# Patient Record
Sex: MALE | Race: ASIAN | NOT HISPANIC OR LATINO | Employment: OTHER | URBAN - METROPOLITAN AREA
[De-identification: names, ages, dates, MRNs, and addresses within clinical notes are randomized per-mention and may not be internally consistent; named-entity substitution may affect disease eponyms.]

---

## 2017-06-06 DIAGNOSIS — G40.909 SEIZURE DISORDER (H): ICD-10-CM

## 2017-06-06 DIAGNOSIS — I10 ESSENTIAL HYPERTENSION, MALIGNANT: ICD-10-CM

## 2017-06-07 RX ORDER — LEVETIRACETAM 1000 MG/1
1 TABLET ORAL 2 TIMES DAILY
Qty: 180 TABLET | Refills: 1 | Status: SHIPPED | OUTPATIENT
Start: 2017-06-07 | End: 2017-12-07

## 2017-06-07 RX ORDER — AMLODIPINE BESYLATE 10 MG/1
10 TABLET ORAL DAILY
Qty: 90 TABLET | Refills: 1 | Status: SHIPPED | OUTPATIENT
Start: 2017-06-07 | End: 2017-12-07

## 2017-07-14 DIAGNOSIS — I10 ESSENTIAL HYPERTENSION, MALIGNANT: ICD-10-CM

## 2017-07-15 RX ORDER — METOPROLOL TARTRATE 100 MG
100 TABLET ORAL 2 TIMES DAILY
Qty: 180 TABLET | Refills: 3 | Status: SHIPPED | OUTPATIENT
Start: 2017-07-15 | End: 2018-07-09

## 2017-07-19 ENCOUNTER — OFFICE VISIT (OUTPATIENT)
Dept: FAMILY MEDICINE | Facility: CLINIC | Age: 37
End: 2017-07-19

## 2017-07-19 VITALS
OXYGEN SATURATION: 95 % | TEMPERATURE: 98.1 F | WEIGHT: 241.2 LBS | HEIGHT: 65 IN | HEART RATE: 119 BPM | BODY MASS INDEX: 40.19 KG/M2 | DIASTOLIC BLOOD PRESSURE: 72 MMHG | SYSTOLIC BLOOD PRESSURE: 105 MMHG

## 2017-07-19 DIAGNOSIS — I62.9 INTRACRANIAL HEMORRHAGE (H): ICD-10-CM

## 2017-07-19 DIAGNOSIS — I10 ESSENTIAL HYPERTENSION, MALIGNANT: Primary | ICD-10-CM

## 2017-07-19 DIAGNOSIS — F10.10 ETOH ABUSE: ICD-10-CM

## 2017-07-19 DIAGNOSIS — G40.309 GENERALIZED CONVULSIVE EPILEPSY (H): ICD-10-CM

## 2017-07-19 LAB
ALBUMIN SERPL BCP-MCNC: 3.7 G/DL (ref 3.5–5)
ALP SERPL-CCNC: 75 U/L (ref 45–120)
ALT SERPL W/O P-5'-P-CCNC: 110 U/L (ref 0–45)
AST SERPL-CCNC: 92 U/L (ref 0–40)
BILIRUB DIRECT SERPL-MCNC: 0.2 MG/DL (ref 0–0.5)
BILIRUB SERPL-MCNC: 0.6 MG/DL (ref 0–1)
BUN SERPL-MCNC: 10 MG/DL (ref 5–24)
CALCIUM SERPL-MCNC: 9.5 MG/DL (ref 8.5–10.4)
CHLORIDE SERPLBLD-SCNC: 102 MMOL/L (ref 94–109)
CO2 SERPL-SCNC: 23 MMOL/L (ref 20–32)
CREAT SERPL-MCNC: 1.4 MG/DL (ref 0.8–1.5)
EGFR CALCULATED (BLACK REFERENCE): 72.9 ML/MIN
EGFR CALCULATED (NON BLACK REFERENCE): 60.3 ML/MIN
GLUCOSE SERPL-MCNC: 119 MG/DL (ref 60–109)
LEVETIRACETAM SERPL-MCNC: 35.4 UG/ML (ref 6–46)
POTASSIUM SERPL-SCNC: 3.4 MMOL/L (ref 3.4–5.3)
PROT SERPL-MCNC: 7.9 G/DL (ref 6–8)
SODIUM SERPL-SCNC: 138 MMOL/L (ref 133–144)

## 2017-07-19 NOTE — MR AVS SNAPSHOT
"              After Visit Summary   7/19/2017    Daisy Sun    MRN: [id number]           Patient Information     Date Of Birth          1980        Visit Information        Provider Department      7/19/2017 3:40 PM Patricia Mason MD [facility]        Today's Diagnoses     Essential hypertension, malignant    -  1    Generalized convulsive epilepsy (H)        ETOH abuse           Follow-ups after your visit        Who to contact     Please call your clinic at [phone] to:    Ask questions about your health    Make or cancel appointments    Discuss your medicines    Learn about your test results    Speak to your doctor   If you have compliments or concerns about an experience at your clinic, or if you wish to file a complaint, please contact [facility] Patient Relations at [phone] or email us at [email or web address]         Additional Information About Your Visit        Care EveryWhere ID     This is your Care EveryWhere ID. This could be used by other organizations to access your [facility] medical records  [id number]        Your Vitals Were     Pulse Temperature Height Pulse Oximetry BMI (Body Mass Index)       119 98.1  F (36.7  C) (Oral) 5' 4.5\" (163.8 cm) 95% 40.76 kg/m2        Blood Pressure from Last 3 Encounters:   07/19/17 105/72   08/17/16 112/82   12/09/15 127/87    Weight from Last 3 Encounters:   07/19/17 241 lb 3.2 oz (109.4 kg)   08/17/16 227 lb (103 kg)   12/09/15 217 lb 6.4 oz (98.6 kg)              We Performed the Following     Basic Metabolic Panel ([facility])  - Results < 1 hr     Hepatic Profile ([facility])     Levetiracetam ([facility])        Primary Care Provider Office Phone # Fax #    Patricia Mason [phone]       [facility] [address] [place] [address]        Equal Access to Services     AMAN TALAVERA AH: Hadii aad ku hadasho Soomaali, waaxda luqadaha, qaybta kaalmada adeegyada, waxay " loreta briggsjose luis tonemarques bowersaan ah. So [facility] [phone].    ATENCIÓN: Si morganla radha, tiene a milan disposición servicios gratuitos de asistencia lingüística. Aman al [phone].    We comply with applicable federal civil rights laws and [place] laws. We do not discriminate on the basis of race, color, national origin, age, disability sex, sexual orientation or gender identity.            Thank you!     Thank you for choosing [facility]  for your care. Our goal is always to provide you with excellent care. Hearing back from our patients is one way we can continue to improve our services. Please take a few minutes to complete the written survey that you may receive in the mail after your visit with us. Thank you!             Your Updated Medication List - Protect others around you: Learn how to safely use, store and throw away your medicines at www.disposemymeds.org.          This list is accurate as of: 7/19/17  4:58 PM.  Always use your most recent med list.                   Brand Name Dispense Instructions for use Diagnosis    amLODIPine 10 MG tablet    NORVASC    90 tablet    Take 1 tablet (10 mg) by mouth daily    Essential hypertension, malignant       citalopram 40 MG tablet    celeXA    90 tablet    Take 1 tablet (40 mg) by mouth daily    Major depressive disorder, recurrent episode, moderate (H)       levETIRAcetam 1000 MG Tabs     180 tablet    Take 1 tablet by mouth 2 times daily    Seizure disorder (H)       losartan-hydrochlorothiazide 100-25 MG per tablet    HYZAAR    90 tablet    Take 1 tablet by mouth daily    Essential hypertension, malignant       metoprolol 100 MG tablet    LOPRESSOR    180 tablet    Take 1 tablet (100 mg) by mouth 2 times daily    Essential hypertension, malignant

## 2017-07-19 NOTE — LETTER
July 21, 2017      Daisy Dino  [address] [place] [address]        Dear Daisy,    Kidneys still are good.  Seizure med dose still good, liver is still inflammed and need to reduce alcohol intake no more than 3 beers/day!     Please see below for your test results.    Resulted Orders   Basic Metabolic Panel ([facility])  - Results < 1 hr   Result Value Ref Range    Glucose 119.0 (H) 60.0 - 109.0 mg/dL    Urea Nitrogen 10.0 5.0 - 24.0 mg/dL    Creatinine 1.4 0.8 - 1.5 mg/dL    Sodium 138.0 133.0 - 144.0 mmol/L    Potassium 3.4 3.4 - 5.3 mmol/L    Chloride 102.0 94.0 - 109.0 mmol/L    Carbon Dioxide 23.0 20.0 - 32.0 mmol/L    Calcium 9.5 8.5 - 10.4 mg/dL    eGFR Calculated (Non Black Reference) 60.3 >60.0 mL/min    eGFR Calculated (Black Reference) 72.9 >60.0 mL/min   [facility]   Result Value Ref Range    LEVETIRACETAM 35.4 6.0 - 46.0 ug/mL    Narrative    Test performed by:  [facility] [address] [place] [address]  Suggested Trough Concentrations: 6.0 - 46.0 ug/mL   Hepatic Profile ([facility])   Result Value Ref Range    Bilirubin Total 0.6 0.0 - 1.0 mg/dL    Bilirubin Direct 0.2 <=0.5 mg/dL    Protein Total 7.9 6.0 - 8.0 g/dL    Albumin 3.7 3.5 - 5.0 g/dL    Alkaline Phosphatase 75 45 - 120 U/L    AST (SGOT) 92 (H) 0 - 40 U/L    ALT (SGPT) 110 (H) 0 - 45 U/L    Narrative    Test performed by:  [facility] [address] [place] [address]       If you have any questions, please call the clinic to make an appointment.    Sincerely,    Patricia Mason MD

## 2017-07-19 NOTE — PROGRESS NOTES
HPI:       Daisy Sun is a 36 year old  male with a significant past medical history of hypertension who presents for follow up of concern(s) listed below    1.  HTN        A Hmong  was used for this visit         PMHX:     Patient Active Problem List   Diagnosis     Essential hypertension, malignant     Anoxic brain damage (H)     Health Care Home     Generalized convulsive epilepsy (H)     Intracranial hemorrhage (H)     Major depressive disorder, recurrent episode, moderate (H)     Hemiparesis affecting right side as late effect of cerebrovascular accident (H)     Tobacco dependence syndrome     Alcohol abuse     Adiposity     Drug-induced mood disorder (H)       Current Outpatient Prescriptions   Medication Sig Dispense Refill     metoprolol (LOPRESSOR) 100 MG tablet Take 1 tablet (100 mg) by mouth 2 times daily 180 tablet 3     levETIRAcetam 1000 MG TABS Take 1 tablet by mouth 2 times daily 180 tablet 1     amLODIPine (NORVASC) 10 MG tablet Take 1 tablet (10 mg) by mouth daily 90 tablet 1     citalopram (CELEXA) 40 MG tablet Take 1 tablet (40 mg) by mouth daily 90 tablet 3     losartan-hydrochlorothiazide (HYZAAR) 100-25 MG per tablet Take 1 tablet by mouth daily 90 tablet 3       Social History     Social History     Marital status: Single     Spouse name: N/A     Number of children: N/A     Years of education: N/A     Occupational History     disabled      Social History Main Topics     Smoking status: Current Every Day Smoker     Types: Cigarettes     Smokeless tobacco: Never Used      Comment: chain smoking     Alcohol use 0.0 oz/week     0 Standard drinks or equivalent per week      Comment: >10 beers/day     Drug use: No     Sexual activity: Not on file     Other Topics Concern     Not on file     Social History Narrative    Lives with brother, sister in law and their children        No Known Allergies    Results for orders placed or performed in visit on 07/19/17 (from the past 24 hour(s))  "  Basic Metabolic Panel ([facility])  - Results < 1 hr   Result Value Ref Range    Glucose 119.0 (H) 60.0 - 109.0 mg/dL    Urea Nitrogen 10.0 5.0 - 24.0 mg/dL    Creatinine 1.4 0.8 - 1.5 mg/dL    Sodium 138.0 133.0 - 144.0 mmol/L    Potassium 3.4 3.4 - 5.3 mmol/L    Chloride 102.0 94.0 - 109.0 mmol/L    Carbon Dioxide 23.0 20.0 - 32.0 mmol/L    Calcium 9.5 8.5 - 10.4 mg/dL    eGFR Calculated (Non Black Reference) 60.3 >60.0 mL/min    eGFR Calculated (Black Reference) 72.9 >60.0 mL/min            Review of Systems:   C: NEGATIVE for fatigue, unexpected change in weight  E: NEGATIVE for acute vision problems or changes  R: NEGATIVE for significant cough or shortness of breath  CV: NEGATIVE for chest pain, palpitations or new or worsening peripheral edema  P: NEGATIVE for changes in mood or affect          Physical Exam:     Vitals:    07/19/17 1602   BP: 105/72   BP Location: Left arm   Patient Position: Chair   Cuff Size: Adult Regular   Pulse: 119   Temp: 98.1  F (36.7  C)   TempSrc: Oral   SpO2: 95%   Weight: 241 lb 3.2 oz (109.4 kg)   Height: 5' 4.5\" (163.8 cm)     Body mass index is 40.76 kg/(m^2).    GENERAL APPEARANCE: alert and no distress, post stroke changes and slurred speech but able to commmunicate  RESP: lungs clear to auscultation - no rales, rhonchi or wheezes  CV: regular rate and rhythm,  and no murmur, click,  rub or gallop  NEURO: right facial droop, right arm flexed with hand flexed, right leg with AFO and walks with swinging gait      Assessment and Plan     (I10) Essential hypertension, malignant  (primary encounter diagnosis)  Comment: controlled on current meds  Plan: Basic Metabolic Panel ([facility])  - Results < 1         hr          (G40.309) Generalized convulsive epilepsy (H)  Comment: appears to be stable without active or breakthrough seizures  Plan: Levetiracetam ("[facility] [employer]        Check levels  (F10.10) ETOH abuse  Comment: reviewed reduced intake, safe intake, on current meds " interactions  Plan: Hepatic Profile ([facility]), CANCELED: HEPATIC        PANEL ([facility])        Check liver enzymes and adjust as needed.    STROKE: stable symptoms and on bp and lipids, hx of hemorrhagic so avoid ASA      Options for treatment and follow-up care were reviewed with the patient and/or guardian. Daisy Sun and/or guardian engaged in the decision making process and verbalized understanding of the options discussed and agreed with the final plan.    Patricia Mason MD

## 2017-07-21 NOTE — PROGRESS NOTES
Call patient:    Kidneys still are good.  Seizure med dose still good, liver is still inflammed and need to reduce alcohol intake no more than 3 beers/day!

## 2017-09-07 DIAGNOSIS — I10 ESSENTIAL HYPERTENSION, MALIGNANT: ICD-10-CM

## 2017-09-07 RX ORDER — LOSARTAN POTASSIUM AND HYDROCHLOROTHIAZIDE 25; 100 MG/1; MG/1
1 TABLET ORAL DAILY
Qty: 90 TABLET | Refills: 3 | Status: SHIPPED | OUTPATIENT
Start: 2017-09-07 | End: 2018-07-25

## 2017-12-07 DIAGNOSIS — G40.909 SEIZURE DISORDER (H): ICD-10-CM

## 2017-12-07 DIAGNOSIS — I10 ESSENTIAL HYPERTENSION, MALIGNANT: ICD-10-CM

## 2017-12-07 RX ORDER — LEVETIRACETAM 1000 MG/1
1 TABLET ORAL 2 TIMES DAILY
Qty: 180 TABLET | Refills: 3 | Status: SHIPPED | OUTPATIENT
Start: 2017-12-07 | End: 2018-11-06

## 2017-12-07 RX ORDER — AMLODIPINE BESYLATE 10 MG/1
10 TABLET ORAL DAILY
Qty: 90 TABLET | Refills: 3 | Status: SHIPPED | OUTPATIENT
Start: 2017-12-07 | End: 2018-12-17

## 2018-03-12 DIAGNOSIS — F33.1 MAJOR DEPRESSIVE DISORDER, RECURRENT EPISODE, MODERATE (H): ICD-10-CM

## 2018-03-13 RX ORDER — CITALOPRAM HYDROBROMIDE 40 MG/1
40 TABLET ORAL DAILY
Qty: 90 TABLET | Refills: 3 | Status: SHIPPED | OUTPATIENT
Start: 2018-03-13 | End: 2019-03-20

## 2018-07-09 DIAGNOSIS — I10 ESSENTIAL HYPERTENSION, MALIGNANT: ICD-10-CM

## 2018-07-10 RX ORDER — METOPROLOL TARTRATE 100 MG
100 TABLET ORAL 2 TIMES DAILY
Qty: 180 TABLET | Refills: 0 | Status: SHIPPED | OUTPATIENT
Start: 2018-07-10 | End: 2020-11-23

## 2018-07-25 ENCOUNTER — OFFICE VISIT (OUTPATIENT)
Dept: FAMILY MEDICINE | Facility: CLINIC | Age: 38
End: 2018-07-25
Payer: MEDICARE

## 2018-07-25 ENCOUNTER — RECORDS - HEALTHEAST (OUTPATIENT)
Dept: ADMINISTRATIVE | Facility: OTHER | Age: 38
End: 2018-07-25

## 2018-07-25 VITALS
BODY MASS INDEX: 40.9 KG/M2 | HEART RATE: 70 BPM | OXYGEN SATURATION: 97 % | TEMPERATURE: 98 F | SYSTOLIC BLOOD PRESSURE: 109 MMHG | DIASTOLIC BLOOD PRESSURE: 71 MMHG | WEIGHT: 242 LBS

## 2018-07-25 DIAGNOSIS — R10.11 ABDOMINAL PAIN, RIGHT UPPER QUADRANT: ICD-10-CM

## 2018-07-25 DIAGNOSIS — E87.1 HYPONATREMIA: ICD-10-CM

## 2018-07-25 DIAGNOSIS — F10.10 ALCOHOL ABUSE: ICD-10-CM

## 2018-07-25 DIAGNOSIS — I10 ESSENTIAL HYPERTENSION, MALIGNANT: ICD-10-CM

## 2018-07-25 DIAGNOSIS — G40.309 GENERALIZED CONVULSIVE EPILEPSY (H): Primary | ICD-10-CM

## 2018-07-25 DIAGNOSIS — I69.351 HEMIPARESIS AFFECTING RIGHT SIDE AS LATE EFFECT OF CEREBROVASCULAR ACCIDENT (H): ICD-10-CM

## 2018-07-25 LAB
ALBUMIN SERPL-MCNC: 3.6 G/DL (ref 3.3–4.6)
ALP SERPL-CCNC: 89 U/L (ref 40–150)
ALT SERPL-CCNC: 81 U/L (ref 0–45)
AST SERPL-CCNC: 98 U/L (ref 0–55)
BILIRUB SERPL-MCNC: 0.6 MG/DL (ref 0.2–1.3)
BUN SERPL-MCNC: 9 MG/DL (ref 5–24)
CALCIUM SERPL-MCNC: 8.8 MG/DL (ref 8.5–10.4)
CHLORIDE SERPLBLD-SCNC: 101 MMOL/L (ref 94–109)
CO2 SERPL-SCNC: 20 MMOL/L (ref 20–32)
CREAT SERPL-MCNC: 1.2 MG/DL (ref 0.8–1.5)
EGFR CALCULATED (BLACK REFERENCE): 86.7 ML/MIN
EGFR CALCULATED (NON BLACK REFERENCE): 71.6 ML/MIN
GLUCOSE SERPL-MCNC: 102 MG/DL (ref 60–109)
LEVETIRACETAM SERPL-MCNC: 44.1 UG/ML (ref 6–46)
POTASSIUM SERPL-SCNC: 3.6 MMOL/L (ref 3.4–5.3)
PROT SERPL-MCNC: 7.8 G/DL (ref 6.6–8.8)
SODIUM SERPL-SCNC: 129 MMOL/L (ref 133–144)

## 2018-07-25 RX ORDER — LOSARTAN POTASSIUM 100 MG/1
100 TABLET ORAL DAILY
Qty: 90 TABLET | Refills: 3 | Status: SHIPPED | OUTPATIENT
Start: 2018-07-25 | End: 2018-09-06

## 2018-07-25 NOTE — MR AVS SNAPSHOT
"              After Visit Summary   2018    Daisy Sun    MRN: [id number]           Patient Information     Date Of Birth          1980        Visit Information        Provider Department      2018 1:40 PM Patricia Mason MD [facility]        Today's Diagnoses     Generalized convulsive epilepsy (H)    -  1    Alcohol abuse        Essential hypertension, malignant        Abdominal pain, right upper quadrant        Hemiparesis affecting right side as late effect of cerebrovascular accident (H)        Hyponatremia          Care Instructions      Can consider trying some \"non-alcoholic\" beer.     Cut back on your beer drinking.     Below are some examples.     Continue all current medications.     Follow up in Late September, Early October.                   Follow-ups after your visit        Your next 10 appointments already scheduled     Sep 19, 2018  2:00 PM CDT   Return Visit with Patricia Mason MD   [facility] ([facility])    [address] [place] [address]   [phone]              Who to contact     Please call your clinic at [phone] to:    Ask questions about your health    Make or cancel appointments    Discuss your medicines    Learn about your test results    Speak to your doctor            Additional Information About Your Visit        MyChart Information     [employer] is an electronic gateway that provides easy, online access to your medical records. With [employer], you can request a clinic appointment, read your test results, renew a prescription or communicate with your care team.     To sign up for Return Patht visit the website at [email or web address]   You will be asked to enter the access code listed below, as well as some personal information. Please follow the directions to create your username and password.     Your access code is: [email or web address]  Expires: 10/23/2018  1:41 PM     Your access code will  in 90 days. If you need " help or a new code, please contact your [facility] or call [phone] for assistance.        Care EveryWhere ID     This is your Care EveryWhere ID. This could be used by other organizations to access your [facility] medical records  [id number]        Your Vitals Were     Pulse Temperature Pulse Oximetry BMI (Body Mass Index)          70 98  F (36.7  C) (Oral) 97% 40.9 kg/m2         Blood Pressure from Last 3 Encounters:   07/25/18 109/71   07/19/17 105/72   08/17/16 112/82    Weight from Last 3 Encounters:   07/25/18 242 lb (109.8 kg)   07/19/17 241 lb 3.2 oz (109.4 kg)   08/17/16 227 lb (103 kg)              We Performed the Following     Comprehensive Metabolic Panel ([facility]) - results <1hr Protocol     Levetiracetam ([facility])     US ABDOMEN COMPLETE          Today's Medication Changes          These changes are accurate as of 7/25/18  2:49 PM.  If you have any questions, ask your nurse or doctor.               Start taking these medicines.        Dose/Directions    losartan 100 MG tablet   Commonly known as:  COZAAR   Used for:  Essential hypertension, malignant   Started by:  Patricia Mason MD        Dose:  100 mg   Take 1 tablet (100 mg) by mouth daily   Quantity:  90 tablet   Refills:  3       order for DME   Used for:  Hemiparesis affecting right side as late effect of cerebrovascular accident (H)   Started by:  Patriica Mason MD        Equipment being ordered: New AFO brace   Quantity:  1 Units   Refills:  0            Where to get your medicines      These medications were sent to [id number] - [place], [place] - [address], [place] [address]     Phone:  [phone]     losartan 100 MG tablet         Some of these will need a paper prescription and others can be bought over the counter.  Ask your nurse if you have questions.     Bring a paper prescription for each of these medications     order for DME                 Primary Care Provider Office Phone # Fax #    Patricia Mason [phone]       [address] [place] [address]        Equal Access to Services     NEVAZAY SADAF : Hadjuan fabian chaidez mylene White, waaxda luqadaha, qaybta kaalmada nnamdi, sinan martinez laChrisjennifer valdes. So [facility] [phone].    ATENCIÓN: Si habla español, tiene a milan disposición servicios gratuitos de asistencia lingüística. Llame al [phone].    We comply with applicable federal civil rights laws and [place] laws. We do not discriminate on the basis of race, color, national origin, age, disability, sex, sexual orientation, or gender identity.            Thank you!     Thank you for choosing [facility]  for your care. Our goal is always to provide you with excellent care. Hearing back from our patients is one way we can continue to improve our services. Please take a few minutes to complete the written survey that you may receive in the mail after your visit with us. Thank you!             Your Updated Medication List - Protect others around you: Learn how to safely use, store and throw away your medicines at www.disposemymeds.org.          This list is accurate as of 7/25/18  2:49 PM.  Always use your most recent med list.                   Brand Name Dispense Instructions for use Diagnosis    amLODIPine 10 MG tablet    NORVASC    90 tablet    Take 1 tablet (10 mg) by mouth daily    Essential hypertension, malignant       citalopram 40 MG tablet    celeXA    90 tablet    Take 1 tablet (40 mg) by mouth daily    Major depressive disorder, recurrent episode, moderate (H)       levETIRAcetam 1000 MG Tabs     180 tablet    Take 1 tablet by mouth 2 times daily    Seizure disorder (H)       losartan 100 MG tablet    COZAAR    90 tablet    Take 1 tablet (100 mg) by mouth daily    Essential hypertension, malignant       losartan-hydrochlorothiazide 100-25 MG per tablet    HYZAAR    90 tablet    Take 1  tablet by mouth daily    Essential hypertension, malignant       metoprolol tartrate 100 MG tablet    LOPRESSOR    180 tablet    Take 1 tablet (100 mg) by mouth 2 times daily    Essential hypertension, malignant       order for DME     1 Units    Equipment being ordered: New AFO brace    Hemiparesis affecting right side as late effect of cerebrovascular accident (H)

## 2018-07-25 NOTE — PATIENT INSTRUCTIONS
"  Can consider trying some \"non-alcoholic\" beer.     Cut back on your beer drinking.     Below are some examples.     Continue all current medications.     Follow up in Late September, Early October.                 Referral for ( TEST )  :      US Abdomen Complete   LOCATION/PLACE/Provider :    [facility]   DATE & TIME :     7- at 7:00am  PHONE :     [phone]  FAX :     [phone]  ADDITIONAL INFORMATION :     NPO 8 hours prior to US  Appointment made by clinic staff/:    Kriss"

## 2018-07-25 NOTE — PROGRESS NOTES
Chief Complaint   Patient presents with     Hypertension     follow up.      Refill Request     BP meds     Medication Reconciliation     completed.        Assessment and Plan   (G40.309) Generalized convulsive epilepsy (H)  (primary encounter diagnosis)  Comment: seizure free on current meds per report of brother and patient  Plan: Levetiracetam ([facility])        Confirm levels    (F10.10) Alcohol abuse  Comment: some new RUQ pain, and elevated LFTS  Plan: Comprehensive Metabolic Panel ([facility]) -         results <1hr Protocol, US ABDOMEN COMPLETE        Check liver size, consistency, consider certainly reducing ETOH, discussed non-alcoholic beer if that would be a substitute for patient, has tried abstinence and not interested    (I10) Essential hypertension, malignant  Comment: stable  Plan: Comprehensive Metabolic Panel ([facility]) -         results <1hr Protocol, losartan (COZAAR) 100 MG        tablet        No change if renal fxn stable    (R10.11) Abdominal pain, right upper quadrant  Comment: see above  Plan: US ABDOMEN COMPLETE            (I69.351) Hemiparesis affecting right side as late effect of cerebrovascular accident (H)  Comment: Stable  Plan: order for DME        Needs new AFO for foot drop    (E87.1) Hyponatremia  Comment: appears new on labs, appears euvolemic, and will see if improves after stopping HCTZ  Plan: stop arb/HCTZ combo pill and start ARB only medication  -recheck in 2 weeks to see if stabilizes.        Options for treatment and follow-up care were reviewed with the patient and/or guardian. Daisy Dino and/or guardian engaged in the decision making process and verbalized understanding of the options discussed and agreed with the final plan.     Yadira Vargas, MS4    Preceptor Attestation:  I was present with the medical student who participated in the service and in the documentation of this note. I have verified the history and personally performed the physical exam and medical decision  making. I have verified the content of the note, which accurately reflects my assessment of the patient and the plan of care.  Supervising Physician:Patricia Mason MD  [facility]                         HPI:   Daisy Sun is a 37 year old male who presents to clinic today with his brother for follow up of several chronic medical issues.   His blood pressure has been well controlled at home and so they have stopped checking it at home.  He does not have any episodes of dizziness or lightheadedness.  No headaches.  He and his brother report that his mood is pretty stable and the celexa helps a lot.  He is still drinking 6-7 cans of beer per day.  He hasn't tried to stop because it helps distract him from pain on his right side after the stroke.  He describes it as muscle and nerve pain and reports that tylenol helps.  His brother did try to send him to rehab for alcohol use, but he refused to go.     A [employer]  was used for this visit         Review of Systems:   A comprehensive 12 point review of systems was negative unless otherwise noted in the HPI.          PMHX:   Active Problems List  Patient Active Problem List   Diagnosis     Essential hypertension, malignant     Anoxic brain damage (H)     Health Care Home     Generalized convulsive epilepsy (H)     Intracranial hemorrhage (H)     Major depressive disorder, recurrent episode, moderate (H)     Hemiparesis affecting right side as late effect of cerebrovascular accident (H)     Tobacco dependence syndrome     Alcohol abuse     Adiposity     Drug-induced mood disorder (H)     Active problem list reviewed and updated.    Current Medications  Current Outpatient Prescriptions   Medication Sig Dispense Refill     amLODIPine (NORVASC) 10 MG tablet Take 1 tablet (10 mg) by mouth daily 90 tablet 3     citalopram (CELEXA) 40 MG tablet Take 1 tablet (40 mg) by mouth daily 90 tablet 3     levETIRAcetam 1000 MG TABS Take 1 tablet by mouth 2 times daily  180 tablet 3     losartan (COZAAR) 100 MG tablet Take 1 tablet (100 mg) by mouth daily 90 tablet 3     metoprolol tartrate (LOPRESSOR) 100 MG tablet Take 1 tablet (100 mg) by mouth 2 times daily 180 tablet 0     order for DME Equipment being ordered: New AFO brace 1 Units 0     Medication list reviewed and updated.    Social History  Social History   Substance Use Topics     Smoking status: Current Every Day Smoker     Types: Cigarettes     Smokeless tobacco: Never Used      Comment: 6-7 per day.      Alcohol use 0.0 oz/week     0 Standard drinks or equivalent per week      Comment: 6-7 beers daily.      History   Drug Use No       Allergies  No Known Allergies  Allergies and Medication Intolerances Updated         Physical Exam:     Vitals:    07/25/18 1401   BP: 109/71   Pulse: 70   Temp: 98  F (36.7  C)   TempSrc: Oral   SpO2: 97%   Weight: 242 lb (109.8 kg)     Body mass index is 40.9 kg/(m^2).    GENERAL APPEARANCE: alert, no acute distress   RESP: lungs clear to auscultation - no rales, rhonchi, or wheezes   CV: regular rate and rhythm, no murmur, click, rub, or gallop   ABDOMEN: soft, tender in RUQ, difficult to palpate for HSM given habitus and some guarding  MSK: Right foot in AFO brace, Right arm with flexion contracture at elbow and hand  SKIN: no suspicious lesions or rashes   Neuro: Left facial droop and slurred speech  PSYCH: mood and affect stable, some smiling today

## 2018-07-25 NOTE — NURSING NOTE
Chief Complaint   Patient presents with     Hypertension     follow up.      Refill Request     BP meds     Medication Reconciliation     completed.        /71  Pulse 70  Temp 98  F (36.7  C) (Oral)  Wt 242 lb (109.8 kg)  SpO2 97%  BMI 40.9 kg/m2       name: Nejenni Valle  Language: Tavo  Agency: [employer]  Phone number: [phone]  Type of Interpretation: Face to Face, Spoken

## 2018-07-26 NOTE — PROGRESS NOTES
Call patient:    Please let family know his seizure med dose is correct, no changes to that medication.

## 2018-07-27 ENCOUNTER — HOSPITAL ENCOUNTER (OUTPATIENT)
Dept: ULTRASOUND IMAGING | Facility: HOSPITAL | Age: 38
Discharge: HOME | End: 2018-07-27
Attending: FAMILY MEDICINE

## 2018-07-27 ENCOUNTER — COMMUNICATION - HEALTHEAST (OUTPATIENT)
Dept: TELEHEALTH | Facility: CLINIC | Age: 38
End: 2018-07-27

## 2018-07-27 DIAGNOSIS — R10.11 RIGHT UPPER QUADRANT ABDOMINAL PAIN: ICD-10-CM

## 2018-07-31 NOTE — PROGRESS NOTES
Call patient:    Liver shows some fatty liver changes and is enlarged.  Healthy eating and drinking are very important.  Let's recheck labs this fall: November.

## 2018-08-16 ENCOUNTER — ALLIED HEALTH/NURSE VISIT (OUTPATIENT)
Dept: FAMILY MEDICINE | Facility: CLINIC | Age: 38
End: 2018-08-16
Payer: MEDICARE

## 2018-08-16 VITALS
HEIGHT: 65 IN | BODY MASS INDEX: 38.99 KG/M2 | DIASTOLIC BLOOD PRESSURE: 71 MMHG | OXYGEN SATURATION: 96 % | WEIGHT: 234 LBS | RESPIRATION RATE: 16 BRPM | SYSTOLIC BLOOD PRESSURE: 109 MMHG | TEMPERATURE: 98.1 F | HEART RATE: 83 BPM

## 2018-08-16 DIAGNOSIS — E87.1 HYPONATREMIA: ICD-10-CM

## 2018-08-16 LAB
BUN SERPL-MCNC: 13 MG/DL (ref 5–24)
CALCIUM SERPL-MCNC: 9.8 MG/DL (ref 8.5–10.4)
CHLORIDE SERPLBLD-SCNC: 108 MMOL/L (ref 94–109)
CO2 SERPL-SCNC: 22 MMOL/L (ref 20–32)
CREAT SERPL-MCNC: 1.3 MG/DL (ref 0.8–1.5)
EGFR CALCULATED (BLACK REFERENCE): 78.6 ML/MIN
EGFR CALCULATED (NON BLACK REFERENCE): 65 ML/MIN
GLUCOSE SERPL-MCNC: 148 MG/DL (ref 60–109)
POTASSIUM SERPL-SCNC: 3.8 MMOL/L (ref 3.4–5.3)
SODIUM SERPL-SCNC: 140 MMOL/L (ref 133–144)

## 2018-08-16 NOTE — MR AVS SNAPSHOT
After Visit Summary   8/16/2018    Daisy Sun    MRN: [id number]           Patient Information     Date Of Birth          1980        Visit Information        Provider Department      8/16/2018 2:00 PM Nurse, [facility]        Today's Diagnoses     Hyponatremia           Follow-ups after your visit        Your next 10 appointments already scheduled     Aug 16, 2018  1:30 PM CDT   LAB VISIT with [facility]   [facility] ([facility])    [address] [place] [address]   [phone]           If you are coming in for fasting labs, you will need to fast for 10-12 hours prior to your appt. Fasting labs include lipids, cholesterol, glucose, complete metabolic panel, basic metabolic panel, and triglycerides. Do not drink coffee or any other fluids. Water with medications are okay. Do not chew gum as well. If you have any further questions, please contact your health care team.                Aug 16, 2018  2:00 PM CDT   Nurse Visit with [facility] Nurse   [facility] ([facility])    [address] [place] [address]   [phone]            Sep 19, 2018  2:00 PM CDT   Return Visit with Patricia Mason MD   [facility] ([facility])    [address] [place] [address]   [phone]              Who to contact     Please call your clinic at [phone] to:    Ask questions about your health    Make or cancel appointments    Discuss your medicines    Learn about your test results    Speak to your doctor            Additional Information About Your Visit        MyChart Information     WHATT is an electronic gateway that provides easy, online access to your medical records. With WHATT, you can request a clinic appointment, read your test results, renew a prescription or communicate with your care team.     To sign up for [employer] visit the website at [email or web address]   You will be asked to enter the  "access code listed below, as well as some personal information. Please follow the directions to create your username and password.     Your access code is: [email or web address]  Expires: 10/23/2018  1:41 PM     Your access code will  in 90 days. If you need help or a new code, please contact your [facility] or call [phone] for assistance.        Care EveryWhere ID     This is your Care EveryWhere ID. This could be used by other organizations to access your [facility] medical records  [id number]        Your Vitals Were     Pulse Temperature Respirations Height Pulse Oximetry BMI (Body Mass Index)    83 98.1  F (36.7  C) (Oral) 16 5' 5\" (165.1 cm) 96% 38.94 kg/m2       Blood Pressure from Last 3 Encounters:   18 109/71   18 109/71   17 105/72    Weight from Last 3 Encounters:   18 234 lb (106.1 kg)   18 242 lb (109.8 kg)   17 241 lb 3.2 oz (109.4 kg)              We Performed the Following     Basic Metabolic Panel ([facility])  - Results < 1 hr        Primary Care Provider Office Phone # Fax #    Patricia Mason [phone]       [address] [place] [address]        Equal Access to Services     AMAN TALAVERA AH: Hadii fabian chaidez hadasho Somilind, waaxda luqadaha, qaybta kaalmada nnamdi, sinan valdes. So [facility] [phone].    ATENCIÓN: Si habla español, tiene a milan disposición servicios gratuitos de asistencia lingüística. Llame al [phone].    We comply with applicable federal civil rights laws and [place] laws. We do not discriminate on the basis of race, color, national origin, age, disability, sex, sexual orientation, or gender identity.            Thank you!     Thank you for choosing [facility]  for your care. Our goal is always to provide you with excellent care. Hearing back from our patients is one way we can continue to improve our services. Please take a few minutes to " complete the written survey that you may receive in the mail after your visit with us. Thank you!             Your Updated Medication List - Protect others around you: Learn how to safely use, store and throw away your medicines at www.disposemymeds.org.          This list is accurate as of 8/16/18  1:10 PM.  Always use your most recent med list.                   Brand Name Dispense Instructions for use Diagnosis    amLODIPine 10 MG tablet    NORVASC    90 tablet    Take 1 tablet (10 mg) by mouth daily    Essential hypertension, malignant       citalopram 40 MG tablet    celeXA    90 tablet    Take 1 tablet (40 mg) by mouth daily    Major depressive disorder, recurrent episode, moderate (H)       levETIRAcetam 1000 MG Tabs     180 tablet    Take 1 tablet by mouth 2 times daily    Seizure disorder (H)       losartan 100 MG tablet    COZAAR    90 tablet    Take 1 tablet (100 mg) by mouth daily    Essential hypertension, malignant       metoprolol tartrate 100 MG tablet    LOPRESSOR    180 tablet    Take 1 tablet (100 mg) by mouth 2 times daily    Essential hypertension, malignant       order for DME     1 Units    Equipment being ordered: New AFO brace    Hemiparesis affecting right side as late effect of cerebrovascular accident (H)

## 2018-09-06 DIAGNOSIS — I10 ESSENTIAL HYPERTENSION, MALIGNANT: ICD-10-CM

## 2018-09-07 RX ORDER — LOSARTAN POTASSIUM 100 MG/1
100 TABLET ORAL DAILY
Qty: 90 TABLET | Refills: 3 | Status: SHIPPED | OUTPATIENT
Start: 2018-09-07 | End: 2019-11-15

## 2018-09-19 ENCOUNTER — OFFICE VISIT (OUTPATIENT)
Dept: FAMILY MEDICINE | Facility: CLINIC | Age: 38
End: 2018-09-19
Payer: MEDICARE

## 2018-09-19 VITALS
HEART RATE: 86 BPM | OXYGEN SATURATION: 96 % | SYSTOLIC BLOOD PRESSURE: 100 MMHG | DIASTOLIC BLOOD PRESSURE: 60 MMHG | BODY MASS INDEX: 37.82 KG/M2 | RESPIRATION RATE: 20 BRPM | WEIGHT: 227 LBS | HEIGHT: 65 IN | TEMPERATURE: 98.2 F

## 2018-09-19 DIAGNOSIS — E87.1 HYPONATREMIA: Primary | ICD-10-CM

## 2018-09-19 DIAGNOSIS — F10.10 ALCOHOL ABUSE: ICD-10-CM

## 2018-09-19 DIAGNOSIS — R94.5 ABNORMAL RESULTS OF LIVER FUNCTION STUDIES: ICD-10-CM

## 2018-09-19 LAB
ALBUMIN SERPL-MCNC: 3.5 G/DL (ref 3.3–4.6)
ALP SERPL-CCNC: 82 U/L (ref 40–150)
ALT SERPL-CCNC: 118 U/L (ref 0–45)
AST SERPL-CCNC: 142 U/L (ref 0–55)
BILIRUB SERPL-MCNC: 1 MG/DL (ref 0.2–1.3)
BUN SERPL-MCNC: 8 MG/DL (ref 5–24)
CALCIUM SERPL-MCNC: 9 MG/DL (ref 8.5–10.4)
CHLORIDE SERPLBLD-SCNC: 107 MMOL/L (ref 94–109)
CO2 SERPL-SCNC: 20 MMOL/L (ref 20–32)
CREAT SERPL-MCNC: 1.4 MG/DL (ref 0.8–1.5)
EGFR CALCULATED (BLACK REFERENCE): 72.2 ML/MIN
EGFR CALCULATED (NON BLACK REFERENCE): 59.7 ML/MIN
GLUCOSE SERPL-MCNC: 162 MG/DL (ref 60–109)
POTASSIUM SERPL-SCNC: 3.9 MMOL/L (ref 3.4–5.3)
PROT SERPL-MCNC: 8 G/DL (ref 6.6–8.8)
SODIUM SERPL-SCNC: 142 MMOL/L (ref 133–144)

## 2018-09-19 NOTE — PROGRESS NOTES
"       HPI:       Daisy Sun is a 36 year old  male with a significant past medical history of hypertension who presents for follow up of concern(s) listed below    1.  Abnormal liver testing  -denies changes in alcohol, 6 cans/day, gets drunk almost every, has these every night  -not interested in really stopping    2. Hx of hyponatremia:  -unsure as to etiology, does drink water during the day, no obvious symptoms    3.  Mood: stress and depression  -states contributes to his drinking, if \"I had use of my arm and leg I wouldn't drink\"  Not interested in other options      A Purkinje  was used for this visit         PMHX:     Patient Active Problem List   Diagnosis     Essential hypertension, malignant     Anoxic brain damage (H)     Health Care Home     Generalized convulsive epilepsy (H)     Intracranial hemorrhage (H)     Major depressive disorder, recurrent episode, moderate (H)     Hemiparesis affecting right side as late effect of cerebrovascular accident (H)     Tobacco dependence syndrome     Alcohol abuse     Adiposity     Drug-induced mood disorder (H)       Current Outpatient Prescriptions   Medication Sig Dispense Refill     amLODIPine (NORVASC) 10 MG tablet Take 1 tablet (10 mg) by mouth daily 90 tablet 3     losartan (COZAAR) 100 MG tablet Take 1 tablet (100 mg) by mouth daily 90 tablet 3     metoprolol tartrate (LOPRESSOR) 100 MG tablet Take 1 tablet (100 mg) by mouth 2 times daily 180 tablet 0     citalopram (CELEXA) 40 MG tablet Take 1 tablet (40 mg) by mouth daily 90 tablet 3     levETIRAcetam 1000 MG TABS Take 1 tablet by mouth 2 times daily 180 tablet 3     order for DME Equipment being ordered: New AFO brace 1 Units 0       Social History     Social History     Marital status: Single     Spouse name: N/A     Number of children: N/A     Years of education: N/A     Occupational History     disabled      Social History Main Topics     Smoking status: Current Every Day Smoker     Types: " "Cigarettes     Smokeless tobacco: Never Used      Comment: 6-7 per day.      Alcohol use 0.0 oz/week     0 Standard drinks or equivalent per week      Comment: 6-7 beers daily.      Drug use: No     Sexual activity: Not on file     Other Topics Concern     Not on file     Social History Narrative    Lives with brother, sister in law and their children        No Known Allergies             Review of Systems:   C: NEGATIVE for fatigue, unexpected change in weight  P: NEGATIVE for changes in mood or affect          Physical Exam:     Vitals:    09/19/18 1405 09/19/18 1425   BP: 95/60 100/60   Pulse: 86    Resp: 20    Temp: 98.2  F (36.8  C)    TempSrc: Oral    SpO2: 96%    Weight: 227 lb (103 kg)    Height: 5' 4.92\" (164.9 cm)      Body mass index is 37.87 kg/(m^2).    GENERAL APPEARANCE: alert and no distress, post stroke changes and slurred speech but able to commmunicate  RESP: lungs clear to auscultation - no rales, rhonchi or wheezes  CV: regular rate and rhythm,  and no murmur, click,  rub or gallop  NEURO: right facial droop, right arm flexed with hand flexed, right leg with AFO and walks with swinging gait      Assessment and Plan     (E87.1) Hyponatremia  (primary encounter diagnosis)  Comment: resolved  Plan: Comprehensive Metabolic Panel ([facility])        No med changes needed    (R94.5) Abnormal results of liver function studies  Comment: unsure if most obvious ETOH abuse is the sole cause  Plan: Comprehensive Metabolic Panel ([facility]),         Hepatitis B Surface Ab ([facility]), Hepatitis         C Antibody ([facility])        Will confirm no other etiology    (F10.10) Alcohol abuse  Comment: Discussed options  Plan: not ready to quit.  When asked if he would stop if he new it was \"causing him liver damage\" and was non-comital.    Recheck in November: f/u bp which is lower than expected, f/u labs and flu shot.        Options for treatment and follow-up care were reviewed with the patient and/or guardian. Daisy " Dino and/or guardian engaged in the decision making process and verbalized understanding of the options discussed and agreed with the final plan.    Patricia Mason MD

## 2018-09-19 NOTE — PROGRESS NOTES
Informed at visit, Recheck in November: no alcohol, at least <3 cans/day, add on hepatitis c and b studies

## 2018-09-19 NOTE — PATIENT INSTRUCTIONS
- Limit to 3 beers a day. Ideally 0 beers if able to.  - no changes to your medication. Continue taking all the same medication.    Your medication list is printed, please keep this with you, it is helpful to bring this current list to any other medical appointments, the emergency room or hospital.    If you had lab testing today and your results are reassuring or normal they will be be mailed to you within 7 days.     If the lab tests need quick action we will call you with the results.   The phone number we will call with results is # [phone] (home) . If this is not the best number please call our clinic and change the number.    If you need any refills please call your pharmacy and they will contact us.    If you have any further concerns or wish to schedule another appointment you must call our office during normal business hours  [phone] (8-5:00 M-F)  If you have urgent medical questions that cannot wait  you may also call [phone] at any time of day.  If you have a medical emergency please call [phone].    Thank you for coming to [facility].

## 2018-09-19 NOTE — NURSING NOTE
Due to patient being non-English speaking/uses sign language, an  was used for this visit. Only for face-to-face interpretation by an external agency, date and length of interpretation can be found on the scanned worksheet.     name: Kendall Hansen  Agency: [employer]  Language: Tavo   Telephone number: [phone]  Type of interpretation: Face-to-face, spoken

## 2018-09-19 NOTE — MR AVS SNAPSHOT
After Visit Summary   9/19/2018    Daisy Sun    MRN: [id number]           Patient Information     Date Of Birth          1980        Visit Information        Provider Department      9/19/2018 2:00 PM Patricia Mason MD [facility]        Today's Diagnoses     Hyponatremia    -  1    Abnormal results of liver function studies        Alcohol abuse          Care Instructions    - Limit to 3 beers a day. Ideally 0 beers if able to.  - no changes to your medication. Continue taking all the same medication.    Your medication list is printed, please keep this with you, it is helpful to bring this current list to any other medical appointments, the emergency room or hospital.    If you had lab testing today and your results are reassuring or normal they will be be mailed to you within 7 days.     If the lab tests need quick action we will call you with the results.   The phone number we will call with results is # [phone] (home) . If this is not the best number please call our clinic and change the number.    If you need any refills please call your pharmacy and they will contact us.    If you have any further concerns or wish to schedule another appointment you must call our office during normal business hours  [phone] (8-5:00 M-F)  If you have urgent medical questions that cannot wait  you may also call [phone] at any time of day.  If you have a medical emergency please call [phone].    Thank you for coming to [facility].            Follow-ups after your visit        Your next 10 appointments already scheduled     Nov 06, 2018  2:00 PM CST   Return Visit with Patricia Mason MD   [facility] ([facility])    [address] [place] [address]   [phone]              Who to contact     Please call your clinic at [phone] to:    Ask questions about your health    Make or cancel appointments    Discuss your medicines    Learn about  "your test results    Speak to your doctor            Additional Information About Your Visit        [employer] Information     Packbackt is an electronic gateway that provides easy, online access to your medical records. With [employer], you can request a clinic appointment, read your test results, renew a prescription or communicate with your care team.     To sign up for [employer] visit the website at [email or web address]   You will be asked to enter the access code listed below, as well as some personal information. Please follow the directions to create your username and password.     Your access code is: [email or web address]  Expires: 10/23/2018  1:41 PM     Your access code will  in 90 days. If you need help or a new code, please contact your [facility] or call [phone] for assistance.        Care EveryWhere ID     This is your Care EveryWhere ID. This could be used by other organizations to access your [facility] medical records  [id number]        Your Vitals Were     Pulse Temperature Respirations Height Pulse Oximetry BMI (Body Mass Index)    86 98.2  F (36.8  C) (Oral) 20 5' 4.92\" (164.9 cm) 96% 37.87 kg/m2       Blood Pressure from Last 3 Encounters:   18 100/60   18 109/71   18 109/71    Weight from Last 3 Encounters:   18 227 lb (103 kg)   18 234 lb (106.1 kg)   18 242 lb (109.8 kg)              We Performed the Following     Comprehensive Metabolic Panel ([facility])     Hepatitis B Surface Ab ([email or web address])     Hepatitis C Antibody ([email or web address])        Primary Care Provider Office Phone # Fax #    Patricia Mason [phone]       [address] [place] [address]        Equal Access to Services     AMAN TALAVERA : Sim White, hero devine, qaeliza kaalsinan martinez. So [facility] [phone].    ATENCIÓN: Si habla español, tiene a milan disposición servicios " heriberto de asistencia lingüística. Aman layne [phone].    We comply with applicable federal civil rights laws and [place] laws. We do not discriminate on the basis of race, color, national origin, age, disability, sex, sexual orientation, or gender identity.            Thank you!     Thank you for choosing [facility]  for your care. Our goal is always to provide you with excellent care. Hearing back from our patients is one way we can continue to improve our services. Please take a few minutes to complete the written survey that you may receive in the mail after your visit with us. Thank you!             Your Updated Medication List - Protect others around you: Learn how to safely use, store and throw away your medicines at www.disposemymeds.org.          This list is accurate as of 9/19/18  2:44 PM.  Always use your most recent med list.                   Brand Name Dispense Instructions for use Diagnosis    amLODIPine 10 MG tablet    NORVASC    90 tablet    Take 1 tablet (10 mg) by mouth daily    Essential hypertension, malignant       citalopram 40 MG tablet    celeXA    90 tablet    Take 1 tablet (40 mg) by mouth daily    Major depressive disorder, recurrent episode, moderate (H)       levETIRAcetam 1000 MG Tabs     180 tablet    Take 1 tablet by mouth 2 times daily    Seizure disorder (H)       losartan 100 MG tablet    COZAAR    90 tablet    Take 1 tablet (100 mg) by mouth daily    Essential hypertension, malignant       metoprolol tartrate 100 MG tablet    LOPRESSOR    180 tablet    Take 1 tablet (100 mg) by mouth 2 times daily    Essential hypertension, malignant       order for DME     1 Units    Equipment being ordered: New AFO brace    Hemiparesis affecting right side as late effect of cerebrovascular accident (H)

## 2018-09-20 LAB
HBV SURFACE AB SER-ACNC: POSITIVE M[IU]/ML
HCV AB SER QL: NEGATIVE

## 2018-09-21 NOTE — PROGRESS NOTES
Call patient:    Negative for viral hep C, and vaccinated and protected against viral hep B.  Liver irritation appears to be from heavy alcohol use.  Please reduce and or stop completely

## 2018-11-06 ENCOUNTER — OFFICE VISIT (OUTPATIENT)
Dept: FAMILY MEDICINE | Facility: CLINIC | Age: 38
End: 2018-11-06
Payer: MEDICARE

## 2018-11-06 VITALS
TEMPERATURE: 98.5 F | OXYGEN SATURATION: 97 % | WEIGHT: 219.4 LBS | DIASTOLIC BLOOD PRESSURE: 87 MMHG | BODY MASS INDEX: 36.6 KG/M2 | RESPIRATION RATE: 20 BRPM | HEART RATE: 105 BPM | SYSTOLIC BLOOD PRESSURE: 133 MMHG

## 2018-11-06 DIAGNOSIS — Z23 FLU VACCINE NEED: ICD-10-CM

## 2018-11-06 DIAGNOSIS — F10.10 ALCOHOL ABUSE: Primary | ICD-10-CM

## 2018-11-06 DIAGNOSIS — G40.909 SEIZURE DISORDER (H): ICD-10-CM

## 2018-11-06 DIAGNOSIS — Z13.9 SCREENING FOR CONDITION: ICD-10-CM

## 2018-11-06 LAB
ALBUMIN SERPL BCP-MCNC: 3.5 G/DL (ref 3.5–5)
ALP SERPL-CCNC: 99 U/L (ref 45–120)
ALT SERPL W/O P-5'-P-CCNC: 62 U/L (ref 0–45)
AST SERPL-CCNC: 83 U/L (ref 0–40)
BILIRUB DIRECT SERPL-MCNC: 0.3 MG/DL (ref 0–0.5)
BILIRUB SERPL-MCNC: 0.8 MG/DL (ref 0–1)
HIV 1+2 AB+HIV1 P24 AG SERPL QL IA: NEGATIVE
PROT SERPL-MCNC: 7.7 G/DL (ref 6–8)

## 2018-11-06 RX ORDER — LEVETIRACETAM 1000 MG/1
1 TABLET ORAL 2 TIMES DAILY
Qty: 180 TABLET | Refills: 3 | Status: SHIPPED | OUTPATIENT
Start: 2018-11-06 | End: 2019-12-30

## 2018-11-06 NOTE — PATIENT INSTRUCTIONS
- Continue taking your blood pressure medication, no changes.  - Dr. Mason refill your Levetiracetam 1000mg to your [facility] pharmacy.  - Please try to cut down your beer consumption to about 4 beers per day.   - We will call you on your blood work results and follow up with Dr. Mason in the Spring but if ill please come in sooner.     Your medication list is printed, please keep this with you, it is helpful to bring this current list to any other medical appointments, the emergency room or hospital.    If you had lab testing today and your results are reassuring or normal they will be be mailed to you within 7 days.     If the lab tests need quick action we will call you with the results.   The phone number we will call with results is # [phone] (home) . If this is not the best number please call our clinic and change the number.    If you need any refills please call your pharmacy and they will contact us.    If you have any further concerns or wish to schedule another appointment you must call our office during normal business hours  [phone] (8-5:00 M-F)  If you have urgent medical questions that cannot wait  you may also call [phone] at any time of day.  If you have a medical emergency please call [phone].    Thank you for coming to [facility].

## 2018-11-06 NOTE — MR AVS SNAPSHOT
After Visit Summary   11/6/2018    Daisy Sun    MRN: [id number]           Patient Information     Date Of Birth          1980        Visit Information        Provider Department      11/6/2018 2:00 PM Patricia Mason MD [facility]        Today's Diagnoses     Alcohol abuse    -  1    Seizure disorder (H)        Screening for condition        Flu vaccine need          Care Instructions    - Continue taking your blood pressure medication, no changes.  - Dr. Mason refill your Levetiracetam 1000mg to your [facility] pharmacy.  - Please try to cut down your beer consumption to about 4 beers per day.   - We will call you on your blood work results and follow up with Dr. Mason in the Spring but if ill please come in sooner.     Your medication list is printed, please keep this with you, it is helpful to bring this current list to any other medical appointments, the emergency room or hospital.    If you had lab testing today and your results are reassuring or normal they will be be mailed to you within 7 days.     If the lab tests need quick action we will call you with the results.   The phone number we will call with results is # [phone] (home) . If this is not the best number please call our clinic and change the number.    If you need any refills please call your pharmacy and they will contact us.    If you have any further concerns or wish to schedule another appointment you must call our office during normal business hours  [phone] (8-5:00 M-F)  If you have urgent medical questions that cannot wait  you may also call [phone] at any time of day.  If you have a medical emergency please call [phone].    Thank you for coming to [facility].              Follow-ups after your visit        Who to contact     Please call your clinic at [phone] to:    Ask questions about your health    Make or cancel appointments    Discuss your medicines    Learn about your test  results    Speak to your doctor            Additional Information About Your Visit        Care EveryWhere ID     This is your Care EveryWhere ID. This could be used by other organizations to access your [facility] medical records  [id number]        Your Vitals Were     Pulse Temperature Respirations Pulse Oximetry BMI (Body Mass Index)       105 98.5  F (36.9  C) (Oral) 20 97% 36.6 kg/m2        Blood Pressure from Last 3 Encounters:   11/06/18 133/87   09/19/18 100/60   08/16/18 109/71    Weight from Last 3 Encounters:   11/06/18 219 lb 6.4 oz (99.5 kg)   09/19/18 227 lb (103 kg)   08/16/18 234 lb (106.1 kg)              We Performed the Following     ADMIN INFLUENZA VIRUS VAC (MEDICARE)     FLU VAC PRESRV FREE QUAD SPLIT VIR IM, 0.5 mL dosage     Hepatic Profile ([facility])     HIV Ag/Ab Screen [place] ([facility])          Where to get your medicines      These medications were sent to [id number] - [place], [place] - [address], [place] [address]     Phone:  [phone]     levETIRAcetam 1000 MG Tabs          Primary Care Provider Office Phone # Fax #    Patricia Mason [phone]       [address] [place] [address]        Equal Access to Services     AMAN TALAVERA AH: Hadii fabian ku hadasho Sodanaali, waaxda luqadaha, qaybta kaalmada adeegyada, sinan dela cruz . So [facility] [phone].    ATENCIÓN: Si habla español, tiene a milan disposición servicios gratuitos de asistencia lingüística. ame al [phone].    We comply with applicable federal civil rights laws and [place] laws. We do not discriminate on the basis of race, color, national origin, age, disability, sex, sexual orientation, or gender identity.            Thank you!     Thank you for choosing [facility]  for your care. Our goal is always to provide you with excellent care. Hearing back from our patients is one way we can continue to improve our  services. Please take a few minutes to complete the written survey that you may receive in the mail after your visit with us. Thank you!             Your Updated Medication List - Protect others around you: Learn how to safely use, store and throw away your medicines at www.disposemymeds.org.          This list is accurate as of 11/6/18  2:29 PM.  Always use your most recent med list.                   Brand Name Dispense Instructions for use Diagnosis    amLODIPine 10 MG tablet    NORVASC    90 tablet    Take 1 tablet (10 mg) by mouth daily    Essential hypertension, malignant       citalopram 40 MG tablet    celeXA    90 tablet    Take 1 tablet (40 mg) by mouth daily    Major depressive disorder, recurrent episode, moderate (H)       levETIRAcetam 1000 MG Tabs     180 tablet    Take 1 tablet by mouth 2 times daily    Seizure disorder (H)       losartan 100 MG tablet    COZAAR    90 tablet    Take 1 tablet (100 mg) by mouth daily    Essential hypertension, malignant       metoprolol tartrate 100 MG tablet    LOPRESSOR    180 tablet    Take 1 tablet (100 mg) by mouth 2 times daily    Essential hypertension, malignant       order for DME     1 Units    Equipment being ordered: New AFO brace    Hemiparesis affecting right side as late effect of cerebrovascular accident (H)

## 2018-11-06 NOTE — PROGRESS NOTES
HPI:       Daisy Sun is a 36 year old  male with a significant past medical history of hypertension who presents for follow up of concern(s) listed below    1. Alcohol use: heavy  -drinks only beer, 5-6 beers/day  -could possibly reduce  -informed again that     Abnormal liver testing    -not interested in really stopping    2. Seizure med:  -has been off this for one month  -wasn't sure why  -clarified with      A [employer]  was used for this visit         PMHX:     Patient Active Problem List   Diagnosis     Essential hypertension, malignant     Anoxic brain damage (H)     Health Care Home     Generalized convulsive epilepsy (H)     Intracranial hemorrhage (H)     Major depressive disorder, recurrent episode, moderate (H)     Hemiparesis affecting right side as late effect of cerebrovascular accident (H)     Tobacco dependence syndrome     Alcohol abuse     Adiposity     Drug-induced mood disorder (H)       Current Outpatient Prescriptions   Medication Sig Dispense Refill     amLODIPine (NORVASC) 10 MG tablet Take 1 tablet (10 mg) by mouth daily 90 tablet 3     citalopram (CELEXA) 40 MG tablet Take 1 tablet (40 mg) by mouth daily 90 tablet 3     levETIRAcetam 1000 MG TABS Take 1 tablet by mouth 2 times daily 180 tablet 3     losartan (COZAAR) 100 MG tablet Take 1 tablet (100 mg) by mouth daily 90 tablet 3     metoprolol tartrate (LOPRESSOR) 100 MG tablet Take 1 tablet (100 mg) by mouth 2 times daily 180 tablet 0     order for DME Equipment being ordered: New AFO brace 1 Units 0     [DISCONTINUED] levETIRAcetam 1000 MG TABS Take 1 tablet by mouth 2 times daily (Patient not taking: Reported on 11/6/2018) 180 tablet 3       Social History     Social History     Marital status: Single     Spouse name: N/A     Number of children: N/A     Years of education: N/A     Occupational History     disabled      Social History Main Topics     Smoking status: Current Every Day Smoker     Types: Cigarettes      Smokeless tobacco: Never Used      Comment: 6-7 per day.      Alcohol use 0.0 oz/week     0 Standard drinks or equivalent per week      Comment: 6-7 beers daily.      Drug use: No     Sexual activity: Not on file     Other Topics Concern     Not on file     Social History Narrative    Lives with brother, sister in law and their children        No Known Allergies             Review of Systems:   C: NEGATIVE for fatigue, unexpected change in weight  P: NEGATIVE for changes in mood or affect          Physical Exam:     Vitals:    11/06/18 1401   BP: 133/87   Pulse: 105   Resp: 20   Temp: 98.5  F (36.9  C)   TempSrc: Oral   SpO2: 97%   Weight: 219 lb 6.4 oz (99.5 kg)     Body mass index is 36.6 kg/(m^2).    GENERAL APPEARANCE: alert and no distress, post stroke changes and slurred speech but able to commmunicate  RESP: lungs clear to auscultation - no rales, rhonchi or wheezes  CV: regular rate and rhythm,  and no murmur, click,  rub or gallop  NEURO: right facial droop, right arm flexed with hand flexed, right leg with AFO and walks with swinging gait      Assessment and Plan     (F10.10) Alcohol abuse  (primary encounter diagnosis)  Comment: discussed results are getting sent out will call  [email or web address]        Biggest risk is likely ETOH overuse, abuse, and first step to reduce beer to 4 cans/day    (G40.909) Seizure disorder (H)  Comment: Called pharmacy, needing refills  Plan: levETIRAcetam 1000 MG TABS        Sent 1 year refill    (Z13.9) Screening for condition  Comment: agrees to testing  Plan: HIV Ag/Ab Screen [place] ([email or web address])          (Z23) Flu vaccine need  Comment: agree  Plan: FLU VAC PRESRV FREE QUAD SPLIT VIR IM, 0.5 mL         dosage, ADMIN INFLUENZA VIRUS VAC (MEDICARE)                Options for treatment and follow-up care were reviewed with the patient and/or guardian. Daisy Sun and/or guardian engaged in the decision making process and verbalized understanding of the options  discussed and agreed with the final plan.    Patricia Mason MD

## 2018-11-06 NOTE — NURSING NOTE
Injectable influenza vaccine documentation    1. Has the patient received the information for the influenza vaccine? YES    2. Does the patient have a severe allergy to eggs (Patients with a severe egg allergy should be assessed by a medical provider, RN, or clinical pharmacist. If they receive the influenza vaccine, please have them observed for 15 minutes.)? No    3. Has the patient had an allergic reaction to previous influenza vaccines? No    4. Has the patient had any severe allergic reactions to past influenza vaccines ? No       5. Does patient have a history of Guillain-[place] syndrome? No      Based on responses above, I administered the influenza vaccine.  Nubia Valdovinos MA    Due to patient being non-English speaking/uses sign language, an  was used for this visit. Only for face-to-face interpretation by an external agency, date and length of interpretation can be found on the scanned worksheet.     name: Kendall Hansen  Agency: [employer]  Language: [facility]   Telephone number: [phone]  Type of interpretation: Face-to-face, spoken

## 2018-12-17 DIAGNOSIS — I10 ESSENTIAL HYPERTENSION, MALIGNANT: ICD-10-CM

## 2018-12-17 RX ORDER — AMLODIPINE BESYLATE 10 MG/1
10 TABLET ORAL DAILY
Qty: 90 TABLET | Refills: 3 | Status: SHIPPED | OUTPATIENT
Start: 2018-12-17 | End: 2020-11-23

## 2019-03-20 DIAGNOSIS — F33.1 MAJOR DEPRESSIVE DISORDER, RECURRENT EPISODE, MODERATE (H): ICD-10-CM

## 2019-03-20 RX ORDER — CITALOPRAM HYDROBROMIDE 40 MG/1
40 TABLET ORAL DAILY
Qty: 90 TABLET | Refills: 1 | Status: SHIPPED | OUTPATIENT
Start: 2019-03-20 | End: 2019-09-16

## 2019-09-16 DIAGNOSIS — F33.1 MAJOR DEPRESSIVE DISORDER, RECURRENT EPISODE, MODERATE (H): ICD-10-CM

## 2019-09-16 RX ORDER — CITALOPRAM HYDROBROMIDE 40 MG/1
40 TABLET ORAL DAILY
Qty: 90 TABLET | Refills: 3 | Status: SHIPPED | OUTPATIENT
Start: 2019-09-16 | End: 2020-11-23

## 2019-10-17 ENCOUNTER — OFFICE VISIT (OUTPATIENT)
Dept: FAMILY MEDICINE | Facility: CLINIC | Age: 39
End: 2019-10-17
Payer: MEDICARE

## 2019-10-17 VITALS
BODY MASS INDEX: 34.83 KG/M2 | DIASTOLIC BLOOD PRESSURE: 84 MMHG | OXYGEN SATURATION: 92 % | HEART RATE: 98 BPM | TEMPERATURE: 98.3 F | WEIGHT: 208.8 LBS | SYSTOLIC BLOOD PRESSURE: 138 MMHG | RESPIRATION RATE: 16 BRPM

## 2019-10-17 DIAGNOSIS — G40.309 GENERALIZED CONVULSIVE EPILEPSY (H): ICD-10-CM

## 2019-10-17 DIAGNOSIS — Z23 NEED FOR PROPHYLACTIC VACCINATION AND INOCULATION AGAINST INFLUENZA: ICD-10-CM

## 2019-10-17 DIAGNOSIS — E87.1 HYPONATREMIA: ICD-10-CM

## 2019-10-17 DIAGNOSIS — I10 ESSENTIAL HYPERTENSION, MALIGNANT: Primary | ICD-10-CM

## 2019-10-17 DIAGNOSIS — I69.351 HEMIPARESIS AFFECTING RIGHT SIDE AS LATE EFFECT OF CEREBROVASCULAR ACCIDENT (H): ICD-10-CM

## 2019-10-17 LAB
ALBUMIN SERPL-MCNC: 3.1 G/DL (ref 3.3–4.6)
ALP SERPL-CCNC: 92 U/L (ref 40–150)
ALT SERPL-CCNC: 97 U/L (ref 0–45)
AST SERPL-CCNC: 117 U/L (ref 0–55)
BILIRUB SERPL-MCNC: 1.1 MG/DL (ref 0.2–1.3)
BUN SERPL-MCNC: 10 MG/DL (ref 5–24)
CALCIUM SERPL-MCNC: 9.2 MG/DL (ref 8.5–10.4)
CHLORIDE SERPLBLD-SCNC: 102 MMOL/L (ref 94–109)
CHOLEST SERPL-MCNC: 180 MG/DL
CHOLEST/HDLC SERPL: 3.1 RATIO
CO2 SERPL-SCNC: 26 MMOL/L (ref 20–32)
CREAT SERPL-MCNC: 1 MG/DL (ref 0.8–1.5)
EGFR CALCULATED (BLACK REFERENCE): >90 ML/MIN
EGFR CALCULATED (NON BLACK REFERENCE): 87.5 ML/MIN
GLUCOSE SERPL-MCNC: 119 MG/DL (ref 60–109)
HDLC SERPL-MCNC: 58 MG/DL
LDLC SERPL CALC-MCNC: 68 MG/DL (ref 0–99)
LEVETIRACETAM SERPL-MCNC: 28.9 UG/ML (ref 6–46)
POTASSIUM SERPL-SCNC: 4 MMOL/L (ref 3.4–5.3)
PROT SERPL-MCNC: 7.6 G/DL (ref 6.6–8.8)
SODIUM SERPL-SCNC: 146 MMOL/L (ref 133–144)
TRIGL SERPL-MCNC: 271 MG/DL
VLDL-CHOLESTEROL: 54 MG/DL (ref 7–32)

## 2019-10-17 ASSESSMENT — PATIENT HEALTH QUESTIONNAIRE - PHQ9: SUM OF ALL RESPONSES TO PHQ QUESTIONS 1-9: 3

## 2019-10-17 NOTE — PROGRESS NOTES
HPI:       Daisy Sun is a 36 year old  male with a significant past medical history of hypertension who presents for follow up of concern(s) listed below    No concerns    1. Alcohol use: has tried to reduce  -drinks only beer, 5  Beers/day drinks only with friends when over, otherwise not drinking daily  -informed again that   Abnormal liver testing still there but improved    -not interested in really stopping    2. Seizure med:  Can't recall last seizure thinks it has been a long time ago    3. Stroke: needing right AFO adjsuted    A [employer]  was used for this visit         PMHX:     Patient Active Problem List   Diagnosis     Essential hypertension, malignant     Anoxic brain damage (H)     Health Care Home     Generalized convulsive epilepsy (H)     Intracranial hemorrhage (H)     Major depressive disorder, recurrent episode, moderate (H)     Hemiparesis affecting right side as late effect of cerebrovascular accident (H)     Tobacco dependence syndrome     Alcohol abuse     Adiposity     Drug-induced mood disorder (H)       Current Outpatient Medications   Medication Sig Dispense Refill     amLODIPine (NORVASC) 10 MG tablet Take 1 tablet (10 mg) by mouth daily 90 tablet 3     citalopram (CELEXA) 40 MG tablet Take 1 tablet (40 mg) by mouth daily 90 tablet 3     levETIRAcetam 1000 MG TABS Take 1 tablet by mouth 2 times daily 180 tablet 3     losartan (COZAAR) 100 MG tablet Take 1 tablet (100 mg) by mouth daily 90 tablet 3     metoprolol tartrate (LOPRESSOR) 100 MG tablet Take 1 tablet (100 mg) by mouth 2 times daily 180 tablet 0     order for DME Equipment being ordered: New AFO brace 1 Units 0       Social History     Socioeconomic History     Marital status: Single     Spouse name: Not on file     Number of children: Not on file     Years of education: Not on file     Highest education level: Not on file   Occupational History     Occupation: disabled   Social Needs     Financial resource strain:  Not on file     Food insecurity:     Worry: Not on file     Inability: Not on file     Transportation needs:     Medical: Not on file     Non-medical: Not on file   Tobacco Use     Smoking status: Current Every Day Smoker     Types: Cigarettes     Smokeless tobacco: Never Used     Tobacco comment: 6-7 per day.    Substance and Sexual Activity     Alcohol use: Yes     Alcohol/week: 0.0 standard drinks     Comment: 6-7 beers daily.      Drug use: No     Sexual activity: Not on file   Lifestyle     Physical activity:     Days per week: Not on file     Minutes per session: Not on file     Stress: Not on file   Relationships     Social connections:     Talks on phone: Not on file     Gets together: Not on file     Attends [employer] service: Not on file     Active member of club or organization: Not on file     Attends meetings of clubs or organizations: Not on file     Relationship status: Not on file     Intimate partner violence:     Fear of current or ex partner: Not on file     Emotionally abused: Not on file     Physically abused: Not on file     Forced sexual activity: Not on file   Other Topics Concern     Parent/sibling w/ CABG, MI or angioplasty before 65F 55M? Not Asked   Social History Narrative    Lives with brother, sister in law and their children        No Known Allergies             Review of Systems:   C: NEGATIVE for fatigue, unexpected change in weight  P: NEGATIVE for changes in mood or affect          Physical Exam:     Vitals:    10/17/19 1512 10/17/19 1550   BP: (!) 137/95 138/84   Pulse: 98    Resp: 16    Temp: 98.3  F (36.8  C)    TempSrc: Oral    SpO2: 92%    Weight: 94.7 kg (208 lb 12.8 oz)      Body mass index is 34.83 kg/m .    GENERAL APPEARANCE: alert and no distress, post stroke changes and slurred speech but able to commmunicate  RESP: lungs clear to auscultation - no rales, rhonchi or wheezes  CV: regular rate and rhythm,  and no murmur, click,  rub or gallop  NEURO: right facial droop,  right arm flexed with hand flexed, right leg with AFO and walks with swinging gait      Assessment and Plan           (I10) Essential hypertension, malignant  (primary encounter diagnosis)  Comment: controlled  Plan: Comprehensive Metabolic Panel ([facility]) -         results <1hr Protocol, Lipid Panel ([facility])        No changes to meds, encourage physical exercise    (G40.309) Generalized convulsive epilepsy (H)  Comment: seizure free  Plan: Comprehensive Metabolic Panel ([facility]) -         results <1hr Protocol        Will check level    (E87.1) Hyponatremia  Comment: resolved, now hypernatremia   Plan: Comprehensive Metabolic Panel ([facility]) -         results <1hr Protocol        Encourage hydration    (I69.351) Hemiparesis affecting right side as late effect of cerebrovascular accident (H)  Comment: needs new orthotic foot brace  Plan: order for DME        Placed order          Options for treatment and follow-up care were reviewed with the patient and/or guardian. Daisy Dino and/or guardian engaged in the decision making process and verbalized understanding of the options discussed and agreed with the final plan.    Patricia Mason MD

## 2019-10-18 NOTE — RESULT ENCOUNTER NOTE
Informed at visit, Recheck in one year, liver testing improved but still elevated.  Continue same meds, reduce ETOH, healthy diet of fruits and vegetables.

## 2019-11-01 ENCOUNTER — TELEPHONE (OUTPATIENT)
Dept: FAMILY MEDICINE | Facility: CLINIC | Age: 39
End: 2019-11-01

## 2019-11-15 DIAGNOSIS — I10 ESSENTIAL HYPERTENSION, MALIGNANT: ICD-10-CM

## 2019-11-16 RX ORDER — LOSARTAN POTASSIUM 100 MG/1
100 TABLET ORAL DAILY
Qty: 90 TABLET | Refills: 3 | Status: SHIPPED | OUTPATIENT
Start: 2019-11-16 | End: 2020-11-09

## 2019-12-27 DIAGNOSIS — G40.909 SEIZURE DISORDER (H): ICD-10-CM

## 2019-12-30 RX ORDER — LEVETIRACETAM 1000 MG/1
1000 TABLET ORAL 2 TIMES DAILY
Qty: 180 TABLET | Refills: 3 | Status: SHIPPED | OUTPATIENT
Start: 2019-12-30 | End: 2020-11-23

## 2020-11-09 DIAGNOSIS — I10 ESSENTIAL HYPERTENSION, MALIGNANT: ICD-10-CM

## 2020-11-09 NOTE — TELEPHONE ENCOUNTER
Message to physician:     Date of last visit: 10/17/19    Date of next visit if scheduled:none    Last Comprehensive Metabolic Panel:  Sodium   Date Value Ref Range Status   10/17/2019 146.0 (H) 133.0 - 144.0 mmol/L Final     Potassium   Date Value Ref Range Status   10/17/2019 4.0 3.4 - 5.3 mmol/L Final     Chloride   Date Value Ref Range Status   10/17/2019 102.0 94.0 - 109.0 mmol/L Final     Carbon Dioxide   Date Value Ref Range Status   10/17/2019 26.0 20.0 - 32.0 mmol/L Final     Glucose   Date Value Ref Range Status   10/17/2019 119.0 (H) 60.0 - 109.0 mg/dL Final     Urea Nitrogen   Date Value Ref Range Status   10/17/2019 10.0 5.0 - 24.0 mg/dL Final     Creatinine   Date Value Ref Range Status   10/17/2019 1.0 0.8 - 1.5 mg/dL Final     GFR Estimate   Date Value Ref Range Status   12/11/2013 > 60 >60 ml/min/1.73m2 Final     Calcium   Date Value Ref Range Status   10/17/2019 9.2 8.5 - 10.4 mg/dL Final       BP Readings from Last 3 Encounters:   10/17/19 138/84   11/06/18 133/87   09/19/18 100/60       Lab Results   Component Value Date    A1C 5.3 08/17/2016                Please complete refill and CLOSE ENCOUNTER.  Closing the encounter signifies the refill is complete.

## 2020-11-10 RX ORDER — LOSARTAN POTASSIUM 100 MG/1
100 TABLET ORAL DAILY
Qty: 90 TABLET | Refills: 3 | Status: SHIPPED | OUTPATIENT
Start: 2020-11-10 | End: 2020-11-23

## 2020-11-23 ENCOUNTER — OFFICE VISIT (OUTPATIENT)
Dept: FAMILY MEDICINE | Facility: CLINIC | Age: 40
End: 2020-11-23
Payer: MEDICARE

## 2020-11-23 VITALS
HEART RATE: 86 BPM | TEMPERATURE: 98.5 F | DIASTOLIC BLOOD PRESSURE: 105 MMHG | SYSTOLIC BLOOD PRESSURE: 183 MMHG | OXYGEN SATURATION: 98 %

## 2020-11-23 DIAGNOSIS — G40.909 SEIZURE DISORDER (H): ICD-10-CM

## 2020-11-23 DIAGNOSIS — I10 ESSENTIAL HYPERTENSION, MALIGNANT: Primary | ICD-10-CM

## 2020-11-23 DIAGNOSIS — F33.1 MAJOR DEPRESSIVE DISORDER, RECURRENT EPISODE, MODERATE (H): ICD-10-CM

## 2020-11-23 DIAGNOSIS — F10.10 ALCOHOL ABUSE: ICD-10-CM

## 2020-11-23 DIAGNOSIS — B36.0 PITYRIASIS VERSICOLOR: ICD-10-CM

## 2020-11-23 LAB
ALBUMIN SERPL BCP-MCNC: 2.9 G/DL (ref 3.5–5)
ALP SERPL-CCNC: 118 U/L (ref 45–120)
ALT SERPL W/O P-5'-P-CCNC: 15 U/L (ref 0–45)
ANION GAP SERPL CALCULATED.3IONS-SCNC: 12 MMOL/L (ref 5–18)
AST SERPL-CCNC: 21 U/L (ref 0–40)
BILIRUB SERPL-MCNC: 1.5 MG/DL (ref 0–1)
BUN SERPL-MCNC: 19 MG/DL (ref 8–22)
CALCIUM SERPL-MCNC: 9.5 MG/DL (ref 8.5–10.5)
CHLORIDE SERPL-SCNC: 114 MMOL/L (ref 98–107)
CO2 SERPL-SCNC: 18 MMOL/L (ref 22–31)
CREAT SERPL-MCNC: 1.64 MG/DL (ref 0.7–1.3)
GLUCOSE SERPL-MCNC: 97 MG/DL (ref 70–125)
POTASSIUM SERPL-SCNC: 4.5 MMOL/L (ref 3.5–5)
PROT SERPL-MCNC: 7.7 G/DL (ref 6–8)
SODIUM SERPL-SCNC: 144 MMOL/L (ref 136–145)

## 2020-11-23 PROCEDURE — 99214 OFFICE O/P EST MOD 30 MIN: CPT | Mod: GC | Performed by: STUDENT IN AN ORGANIZED HEALTH CARE EDUCATION/TRAINING PROGRAM

## 2020-11-23 PROCEDURE — 36415 COLL VENOUS BLD VENIPUNCTURE: CPT | Performed by: STUDENT IN AN ORGANIZED HEALTH CARE EDUCATION/TRAINING PROGRAM

## 2020-11-23 RX ORDER — CITALOPRAM HYDROBROMIDE 40 MG/1
40 TABLET ORAL DAILY
Qty: 90 TABLET | Refills: 3 | Status: SHIPPED | OUTPATIENT
Start: 2020-11-23 | End: 2022-01-05

## 2020-11-23 RX ORDER — SELENIUM SULFIDE 2.5 MG/100ML
LOTION TOPICAL DAILY
Qty: 118 ML | Refills: 1 | Status: SHIPPED | OUTPATIENT
Start: 2020-11-23 | End: 2020-12-07 | Stop reason: ALTCHOICE

## 2020-11-23 RX ORDER — AMLODIPINE BESYLATE 10 MG/1
10 TABLET ORAL DAILY
Qty: 90 TABLET | Refills: 3 | Status: SHIPPED | OUTPATIENT
Start: 2020-11-23 | End: 2022-01-05

## 2020-11-23 RX ORDER — METOPROLOL TARTRATE 100 MG
100 TABLET ORAL 2 TIMES DAILY
Qty: 180 TABLET | Refills: 0 | Status: SHIPPED | OUTPATIENT
Start: 2020-11-23 | End: 2021-03-22

## 2020-11-23 RX ORDER — LOSARTAN POTASSIUM 100 MG/1
100 TABLET ORAL DAILY
Qty: 90 TABLET | Refills: 3 | Status: SHIPPED | OUTPATIENT
Start: 2020-11-23 | End: 2022-01-05

## 2020-11-23 RX ORDER — LEVETIRACETAM 1000 MG/1
1000 TABLET ORAL 2 TIMES DAILY
Qty: 180 TABLET | Refills: 3 | Status: SHIPPED | OUTPATIENT
Start: 2020-11-23 | End: 2022-01-05

## 2020-11-23 NOTE — PROGRESS NOTES
HPI:       Daisy Sun is a 40 year old  male with a significant past medical history of alcohol use and hypertension who presents for follow up of concern(s) listed below    HTN  - Patient currently on amlodipine, losartan and metoprolol   - BP today of 184/140, this was taken shortly after drawing blood  - Has been out of the amlodipine for about 2.5 weeks  - Still has the losartan and metoprolol and has been taking   - Denies any headache or vision changes   - Has a blood pressure cuff at home, but doesn't really take it at home     EtOH  - Drinks 2 cases of 24 beers in a week  - Last CMP in 10/17/19 with elevated liver enzymes   - No interest in cutting back     Skin lesion  - Hyperpigmented lesions that began last year and have spread over upper trunk on left arm and neck  - Will occasionally itch     A [employer]  was used for this visit         History:     Patient Active Problem List   Diagnosis     Essential hypertension, malignant     Anoxic brain damage (H)     Health Care Home     Generalized convulsive epilepsy (H)     Intracranial hemorrhage (H)     Major depressive disorder, recurrent episode, moderate (H)     Hemiparesis affecting right side as late effect of cerebrovascular accident (H)     Tobacco dependence syndrome     Alcohol abuse     Adiposity     Drug-induced mood disorder (H)       Current Outpatient Medications   Medication Sig Dispense Refill     amLODIPine (NORVASC) 10 MG tablet Take 1 tablet (10 mg) by mouth daily 90 tablet 3     citalopram (CELEXA) 40 MG tablet Take 1 tablet (40 mg) by mouth daily 90 tablet 3     levETIRAcetam (KEPPRA) 1000 MG tablet Take 1 tablet (1,000 mg) by mouth 2 times daily 180 tablet 3     losartan (COZAAR) 100 MG tablet Take 1 tablet (100 mg) by mouth daily 90 tablet 3     metoprolol tartrate (LOPRESSOR) 100 MG tablet Take 1 tablet (100 mg) by mouth 2 times daily 180 tablet 0     order for DME Equipment being ordered:     Redo right leg AFO    Frances[employer] to assess 1 Device 0     order for DME Equipment being ordered: New AFO brace 1 Units 0       Social History     Social History Narrative    Lives with brother, sister in law and their children         No Known Allergies    No results found for this or any previous visit (from the past 24 hour(s)).         Review of Systems:     10 point ROS neg other than the symptoms noted above in the HPI.          Physical Exam:     Vitals:    11/23/20 1436   BP: (!) 184/140   BP Location: Left arm   Patient Position: Sitting   Cuff Size: Adult Large   Pulse: 86   Temp: 98.5  F (36.9  C)   TempSrc: Oral   SpO2: 98%     There is no height or weight on file to calculate BMI.    GENERAL APPEARANCE: healthy, alert and no distress,  RESP: lungs clear to auscultation - no rales, rhonchi or wheezes  CV: regular rate and rhythm,  and no murmur, click,  rub or gallop  ABDOMEN: soft, nontender, without hepatosplenomegaly or masses  MS: extremities normal- no gross deformities noted  SKIN: hyperpigmentation - left arm, neck and chest, well demarcated consistent with tinea versicolor   PSYCH: mood and affect normal/bright           Assessment and Plan:     Patient is a 40 year old male seen for:  (I10) Essential hypertension, malignant  (primary encounter diagnosis)  Comment: Pt with BP of 183/105 today, he ran out of his amlodipine about 2 weeks ago and has not been taking it. He has been taking his other BP medications. He was previously controlled on this regimen, will refill medications and follow-up CMP.   Plan: Comprehensive Metabolic ([facility]) - Results         > 1 hr, amLODIPine (NORVASC) 10 MG tablet,         losartan (COZAAR) 100 MG tablet, metoprolol         tartrate (LOPRESSOR) 100 MG tablet    (F10.10) Alcohol abuse  Comment: Patient with previously elevated LFTs and current EtOH use. He has no interest in cutting back  Plan: CMP    (B36.0) Pityriasis versicolor  Comment: Developed about 1 year ago, has spread over  chest and neck and is present on left arm, hyperpigmentation, well defined boarders, occasionally itchy.   Plan: selenium sulfide (SELSUN) 2.5 % external lotion    (G40.909) Seizure disorder (H)  Plan: levETIRAcetam (KEPPRA) 1000 MG tablet    (F33.1) Major depressive disorder, recurrent episode, moderate (H)  Plan: citalopram (CELEXA) 40 MG tablet        Options for treatment and follow-up care were reviewed with the patient and/or guardian. Daisy Dino and/or guardian engaged in the decision making process and verbalized understanding of the options discussed and agreed with the final plan.    Rosemarie Hamilton MD  [facility] Resident   Pager#[phone]    Precepted with: Patricia Mason MD

## 2020-11-23 NOTE — PROGRESS NOTES
Preceptor Attestation:  Patient's case reviewed and discussed with Rosemarie Hamilton MD resident and I evaluated the patient. I agree with written assessment and plan of care.  Supervising Physician:  KOTA ORTEGA MD  [facility]

## 2020-12-07 ENCOUNTER — OFFICE VISIT (OUTPATIENT)
Dept: FAMILY MEDICINE | Facility: CLINIC | Age: 40
End: 2020-12-07
Payer: MEDICARE

## 2020-12-07 VITALS
DIASTOLIC BLOOD PRESSURE: 85 MMHG | SYSTOLIC BLOOD PRESSURE: 125 MMHG | HEART RATE: 58 BPM | BODY MASS INDEX: 31.49 KG/M2 | HEIGHT: 65 IN | TEMPERATURE: 98.7 F | RESPIRATION RATE: 14 BRPM | WEIGHT: 189 LBS | OXYGEN SATURATION: 95 %

## 2020-12-07 DIAGNOSIS — D58.2 ELEVATED HEMOGLOBIN (H): ICD-10-CM

## 2020-12-07 DIAGNOSIS — B36.0 PITYRIASIS VERSICOLOR: ICD-10-CM

## 2020-12-07 DIAGNOSIS — R79.89 ELEVATED SERUM CREATININE: ICD-10-CM

## 2020-12-07 DIAGNOSIS — I10 ESSENTIAL HYPERTENSION, MALIGNANT: Primary | ICD-10-CM

## 2020-12-07 DIAGNOSIS — F10.10 ALCOHOL ABUSE: ICD-10-CM

## 2020-12-07 LAB
ALBUMIN SERPL BCP-MCNC: 2.9 G/DL (ref 3.5–5)
ALP SERPL-CCNC: 115 U/L (ref 45–120)
ALT SERPL W/O P-5'-P-CCNC: 22 U/L (ref 0–45)
ANION GAP SERPL CALCULATED.3IONS-SCNC: 15 MMOL/L (ref 5–18)
AST SERPL-CCNC: 21 U/L (ref 0–40)
BILIRUB SERPL-MCNC: 1.5 MG/DL (ref 0–1)
BUN SERPL-MCNC: 23 MG/DL (ref 8–22)
CALCIUM SERPL-MCNC: 9 MG/DL (ref 8.5–10.5)
CHLORIDE SERPL-SCNC: 115 MMOL/L (ref 98–107)
CO2 SERPL-SCNC: 11 MMOL/L (ref 22–31)
CREAT SERPL-MCNC: 1.68 MG/DL (ref 0.7–1.3)
GLUCOSE SERPL-MCNC: 93 MG/DL (ref 70–125)
HCT VFR BLD AUTO: 58.2 % (ref 40–53)
HEMOGLOBIN: 18.7 G/DL (ref 13.3–17.7)
MCH RBC QN AUTO: 30.7 PG (ref 26.5–35)
MCHC RBC AUTO-ENTMCNC: 32.1 G/DL (ref 32–36)
MCV RBC AUTO: 95.6 FL (ref 78–100)
PLATELET # BLD AUTO: 250 K/UL (ref 150–450)
POTASSIUM SERPL-SCNC: 3.7 MMOL/L (ref 3.5–5)
PROT SERPL-MCNC: 7.5 G/DL (ref 6–8)
RBC # BLD AUTO: 6.09 M/UL (ref 4.4–5.9)
SODIUM SERPL-SCNC: 141 MMOL/L (ref 136–145)
WBC # BLD AUTO: 8.6 K/UL (ref 4–11)

## 2020-12-07 PROCEDURE — 36415 COLL VENOUS BLD VENIPUNCTURE: CPT | Performed by: STUDENT IN AN ORGANIZED HEALTH CARE EDUCATION/TRAINING PROGRAM

## 2020-12-07 PROCEDURE — 85027 COMPLETE CBC AUTOMATED: CPT | Performed by: STUDENT IN AN ORGANIZED HEALTH CARE EDUCATION/TRAINING PROGRAM

## 2020-12-07 PROCEDURE — 99214 OFFICE O/P EST MOD 30 MIN: CPT | Mod: GC | Performed by: STUDENT IN AN ORGANIZED HEALTH CARE EDUCATION/TRAINING PROGRAM

## 2020-12-07 RX ORDER — KETOCONAZOLE 20 MG/G
CREAM TOPICAL 2 TIMES DAILY
Qty: 60 G | Refills: 1 | Status: SHIPPED | OUTPATIENT
Start: 2020-12-07 | End: 2021-01-05

## 2020-12-07 ASSESSMENT — MIFFLIN-ST. JEOR: SCORE: 1683.56

## 2020-12-07 NOTE — PATIENT INSTRUCTIONS
Increase exercise as able, try walking at least 3 times a week    It would be beneficial to also cut back on alcohol use as able as well. Increase water intake, to drink at around 8 glasses of water a day.     Ketoconazole cream sent to pharmacy for skin rash.

## 2020-12-07 NOTE — PROGRESS NOTES
"       HPI:       Daisy Sun is a 40 year old  male with a significant past medical history of CVA, HTN and EtOH use disorder  who presents for follow up of concern(s) listed below    HTN  - At last visit pt was noted to be hypertensive to 183/105  - At that time he had not been taking his amlodipine for about 2 weeks, but he had been taking his losartan and metoprolol  - Patient's amlodipine was refilled and patient is not sure the name of his medications and which ones are for HTN but he states he takes fives pills in AM and two in evening, which matches his medication list   - His brother helps manage his medications   - BP today much improved, at 125/85     Elevated Cr  - At last visit patient had newly elevated Cr of 1.64, his Cr in 2019 was 1.0  - Patient denies any recent illness or changes in how he was taking his medications, he did not appear fluid down at that visit  - Denies any trouble with urination, hematuria, dysuria or weak stream   - No NSAID use, denies any bleeding   - Does endorse feeling tired more easily recently    EtOH use  - Patient states he has an \"occasional beer\"   - He endorses drinking 5 cans of beer a day, about 12 oz cans   - Liver enzymes ok at last visit, had been previously elevated a year before   - Patient does not feel his drinking is a problem   - Also smokes daily     Pityriasis versicolor   - Patient previously prescribed Selsun lotion, this was not covered by insurance  - Per pharmacy ketoconazole would be covered     A [employer]  was used for this visit         History:     Patient Active Problem List   Diagnosis     Essential hypertension, malignant     Anoxic brain damage (H)     Health Care Home     Generalized convulsive epilepsy (H)     Intracranial hemorrhage (H)     Major depressive disorder, recurrent episode, moderate (H)     Hemiparesis affecting right side as late effect of cerebrovascular accident (H)     Tobacco dependence syndrome     Alcohol abuse     " Adiposity     Drug-induced mood disorder (H)       Current Outpatient Medications   Medication Sig Dispense Refill     amLODIPine (NORVASC) 10 MG tablet Take 1 tablet (10 mg) by mouth daily 90 tablet 3     citalopram (CELEXA) 40 MG tablet Take 1 tablet (40 mg) by mouth daily 90 tablet 3     levETIRAcetam (KEPPRA) 1000 MG tablet Take 1 tablet (1,000 mg) by mouth 2 times daily 180 tablet 3     losartan (COZAAR) 100 MG tablet Take 1 tablet (100 mg) by mouth daily 90 tablet 3     metoprolol tartrate (LOPRESSOR) 100 MG tablet Take 1 tablet (100 mg) by mouth 2 times daily 180 tablet 0     order for DME Equipment being ordered:     Redo right leg AFO    [employer] to assess 1 Device 0     order for DME Equipment being ordered: New AFO brace 1 Units 0     selenium sulfide (SELSUN) 2.5 % external lotion Apply topically daily 118 mL 1       Social History     Social History Narrative    Lives with brother, sister in law and their children         No Known Allergies    No results found for this or any previous visit (from the past 24 hour(s)).         Review of Systems:     10 point ROS neg other than the symptoms noted above in the HPI.          Physical Exam:     There were no vitals filed for this visit.  There is no height or weight on file to calculate BMI.    GENERAL APPEARANCE: healthy, alert and no distress,  EYES: Eyes grossly normal to inspection  RESP: lungs clear to auscultation - no rales, rhonchi or wheezes  CV: regular rate and rhythm,  and no murmur, click,  rub or gallop  ABDOMEN: soft, nontender, without hepatosplenomegaly or masses  MS: extremities normal- no gross deformities noted  SKIN: hyperpigmentation - on left arm, chest and neck, similar to last visit  NEURO: Right sided weakness from previous stroke  PSYCH: mood and affect normal/bright           Assessment and Plan:     Patient is a 40 year old male seen for:  (I10) Essential hypertension, malignant  (primary encounter diagnosis)  Comment:  Patient following up HTN at last visit patient has been taking all his BP medications the past few weeks and blood pressure is much better controlled today at 125/85. No medication changes made today, encouraged patient to get more exercise with daily walks outside if able.   Plan: No medication changes today, encouraged more exercise           (R79.89) Elevated serum creatinine  (D58.2) Elevated hemoglobin (H)  Comment: Patient with elevated cr of 1.64 at previous visit, patient was hypertensive at that time, so restarted all BP medications and follow-up for today. CMP shows cr today is 1.7, patient denies any NSAID use or recent bleeding, BUN was not able to be resulted, so CMP will be sent out. Elevated creatinine not post-renal as patient denies any trouble with voiding, he denies any recent illness or hematuria, patient may be dehydrated again today, his Hgb was elevated to 18.7 and hematocrit of 58.2. Assessed patients medications, to see if they could be contributing, losartan could be contributing, however will not make medication change today as BP borderline controlled, could consider renal US in future if Cr not improved. Patient's elevated Hgb could also be secondary to possible dehydration,  hypoxemia, differential includes sleep apnea, will screen at next visit, patient is also a smoker, which could be contributing.   Plan: Comprehensive Metabolic ([facility]) - Results        > 1 hr, CBC with Plt ([facility])        Follow up in 4 weeks for repeat BMP and CBC       Screen for sleep apnea at next visit    (B36.0) Pityriasis versicolor  Comment: Patient with pityriasis versicolor diagnosed at last visit. He was prescribed Selsun, that was not covered. Spoke with pharmacy, will try cream as below, which is covered by insurance.   Plan: ketoconazole (NIZORAL) 2 % external cream    (F10.10) Alcohol abuse  Comment: Patient with ongoing EtOH use, he is not interested in cutting back and does not feel it is a  problem. Patient also with tobacco use.   Plan: Continue to discuss importance of cutting back with patient     Options for treatment and follow-up care were reviewed with the patient and/or guardian. Daisy Dino and/or guardian engaged in the decision making process and verbalized understanding of the options discussed and agreed with the final plan.    Rosemarie Hamilton MD  [facility] Resident   Pager#[phone]    Precepted with: Nikhil Curiel MD    Preceptor Attestation:  I discussed the patient with the resident. I have verified the content of the note, which accurately reflects my assessment of the patient and the plan of care.  Blood pressure now controlled, but Cr remains elevated at 1.7 similar to 11/23. No NSAID use.  Smoking 1/3 pack per day of menthol light cigarettes, and drinking 6 Bud lights per day.  Lives with brother and brother's family, often bored. 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  Had abdominal ultrasound 2 years ago showed normal kidneys. Has been screened for hep b, c, HIV  Recommend close f/u in 3-4 weeks to continue to evaluate kidney disease and work on reducing risk factors of alcohol abuse and smoking.    Supervising Physician:  Nikhil Curiel MD.

## 2020-12-07 NOTE — NURSING NOTE
"Chief Complaint   Patient presents with     Follow Up     Kidneys and Liver?      Medication Reconciliation     completed.        /85 (BP Location: Right arm, Patient Position: Sitting, Cuff Size: Adult Large)   Pulse 58   Temp 98.7  F (37.1  C) (Oral)   Resp 14   Ht 1.65 m (5' 4.96\")   Wt 85.7 kg (189 lb)   SpO2 95%   BMI 31.49 kg/m      BP Recheck if applicable: NA      ~ Tuan Thrasher CMA ([place])  [facility]  Phone: [phone]       Due to patient being non-English speaking/uses sign language, an  was used for this visit. Only for face-to-face interpretation by an external agency, date and length of interpretation can be found on the scanned worksheet.       name: Jesse Thrasher-Her  Language: Tavo  Agency: [employer]  Phone number: [phone]  Type of interpretation: Face to Face Spoken    "

## 2021-01-05 ENCOUNTER — OFFICE VISIT (OUTPATIENT)
Dept: FAMILY MEDICINE | Facility: CLINIC | Age: 41
End: 2021-01-05
Payer: MEDICARE

## 2021-01-05 VITALS
TEMPERATURE: 97.7 F | OXYGEN SATURATION: 96 % | WEIGHT: 188 LBS | HEIGHT: 66 IN | BODY MASS INDEX: 30.22 KG/M2 | SYSTOLIC BLOOD PRESSURE: 120 MMHG | HEART RATE: 62 BPM | DIASTOLIC BLOOD PRESSURE: 83 MMHG | RESPIRATION RATE: 16 BRPM

## 2021-01-05 DIAGNOSIS — R79.89 ELEVATED SERUM CREATININE: ICD-10-CM

## 2021-01-05 DIAGNOSIS — I10 ESSENTIAL HYPERTENSION, MALIGNANT: Primary | ICD-10-CM

## 2021-01-05 DIAGNOSIS — B36.0 PITYRIASIS VERSICOLOR: ICD-10-CM

## 2021-01-05 LAB
% GRANULOCYTES: 78 %G (ref 40–75)
BUN SERPL-MCNC: 25 MG/DL (ref 5–24)
CALCIUM SERPL-MCNC: 9.5 MG/DL (ref 8.5–10.4)
CHLORIDE SERPLBLD-SCNC: 115 MMOL/L (ref 94–109)
CO2 SERPL-SCNC: 21 MMOL/L (ref 20–32)
CREAT SERPL-MCNC: 1.8 MG/DL (ref 0.8–1.5)
EGFR CALCULATED (BLACK REFERENCE): 53.5 ML/MIN
EGFR CALCULATED (NON BLACK REFERENCE): 44.2 ML/MIN
GLUCOSE SERPL-MCNC: 86 MG/DL (ref 60–109)
GRANULOCYTES #: 6.9 K/UL (ref 1.6–8.3)
HCT VFR BLD AUTO: 54.4 % (ref 40–53)
HEMOGLOBIN: 17.3 G/DL (ref 13.3–17.7)
LYMPHOCYTES # BLD AUTO: 1.5 K/UL (ref 0.8–5.3)
LYMPHOCYTES NFR BLD AUTO: 17 %L (ref 20–48)
MCH RBC QN AUTO: 29.9 PG (ref 26.5–35)
MCHC RBC AUTO-ENTMCNC: 31.8 G/DL (ref 32–36)
MCV RBC AUTO: 94 FL (ref 78–100)
MID #: 0.4 K/UL (ref 0–2.2)
MID %: 5 %M (ref 0–20)
PLATELET # BLD AUTO: 230 K/UL (ref 150–450)
POTASSIUM SERPL-SCNC: 4.6 MMOL/L (ref 3.4–5.3)
RBC # BLD AUTO: 5.79 M/UL (ref 4.4–5.9)
SODIUM SERPL-SCNC: 145 MMOL/L (ref 133–144)
WBC # BLD AUTO: 8.9 K/UL (ref 4–11)

## 2021-01-05 PROCEDURE — 80048 BASIC METABOLIC PNL TOTAL CA: CPT | Performed by: FAMILY MEDICINE

## 2021-01-05 PROCEDURE — 36415 COLL VENOUS BLD VENIPUNCTURE: CPT | Performed by: FAMILY MEDICINE

## 2021-01-05 PROCEDURE — 85025 COMPLETE CBC W/AUTO DIFF WBC: CPT | Performed by: FAMILY MEDICINE

## 2021-01-05 PROCEDURE — 99214 OFFICE O/P EST MOD 30 MIN: CPT | Performed by: FAMILY MEDICINE

## 2021-01-05 RX ORDER — KETOCONAZOLE 20 MG/G
CREAM TOPICAL 2 TIMES DAILY
Qty: 60 G | Refills: 1 | Status: SHIPPED | OUTPATIENT
Start: 2021-01-05

## 2021-01-05 ASSESSMENT — MIFFLIN-ST. JEOR: SCORE: 1695.51

## 2021-01-05 NOTE — PROGRESS NOTES
Assessment and Plan      (I10) Essential hypertension, malignant  (primary encounter diagnosis)  Comment: now controlled, on meds as prescribed, described 5 pills in AM and 2 at night which is correct  BP Readings from Last 3 Encounters:   01/05/21 120/83   12/07/20 125/85   11/23/20 (!) 183/105     Plan: Basic Metabolic Panel ([facility])  - Results < 1         hr, CBC with Diff Plt ([facility])      Unsure as to reason for change, would check UA and microalbuminuria.  Possibly HTN nephropathy    (R79.89) Elevated serum creatinine worsened with GFR now stage 3b (acute)  Comment:   Plan: Basic Metabolic Panel ([facility])  - Results < 1         hr, CBC with Diff Plt ([facility])            (B36.0) Pityriasis versicolor  Comment: refilled  Plan: ketoconazole (NIZORAL) 2 % external cream        Discussed use for itchiness, the color may take 1 year to resolve      Results from the last 24 hours   Results for orders placed or performed in visit on 01/05/21 (from the past 24 hour(s))   Basic Metabolic Panel ([facility])  - Results < 1 hr   Result Value Ref Range    Glucose 86.0 60.0 - 109.0 mg/dL    Urea Nitrogen 25.0 (H) 5.0 - 24.0 mg/dL    Creatinine 1.8 (H) 0.8 - 1.5 mg/dL    Sodium 145.0 (H) 133.0 - 144.0 mmol/L    Potassium 4.6 3.4 - 5.3 mmol/L    Chloride 115.0 (H) 94.0 - 109.0 mmol/L    Carbon Dioxide 21.0 20.0 - 32.0 mmol/L    Calcium 9.5 8.5 - 10.4 mg/dL    eGFR Calculated (Non Black Reference) 44.2 (L) >60.0 mL/min    eGFR Calculated (Black Reference) 53.5 (L) >60.0 mL/min   CBC with Diff Plt ([facility])   Result Value Ref Range    WBC 8.9 4.0 - 11.0 K/uL    Lymphocytes # 1.5 0.8 - 5.3 K/uL    % Lymphocytes 17.0 (L) 20.0 - 48.0 %L    Mid # 0.4 0.0 - 2.2 K/uL    Mid % 5.0 0.0 - 20.0 %M    GRANULOCYTES # 6.9 1.6 - 8.3 K/uL    % Granulocytes 78.0 (H) 40.0 - 75.0 %G    RBC 5.79 4.40 - 5.90 M/uL    Hemoglobin 17.3 13.3 - 17.7 g/dL    Hematocrit 54.4 (H) 40.0 - 53.0 %    MCV 94.0 78.0 - 100.0 fL    MCH 29.9 26.5 - 35.0 pg    MCHC 31.8  (L) 32.0 - 36.0 g/dL    Platelets 230.0 150.0 - 450.0 K/uL    Narrative    Result verified by repeat analysis                HPI:       Daisy Sun is a 36 year old  male with a significant past medical history of hypertension who presents for follow up of concern(s) listed below    Chief Complaint   Patient presents with     Kidney Problem     follow-up, recheck kidney function     Medication Reconciliation     completed       1. Had slight increase in GFR  -following recommendation and here today for lab work, denies any complaints    2. Seizure med: compliant with meds  Can't recall last seizure thinks it has been a long time ago    3. Stroke: no new symptoms, states compliant with meds    4. Rash: improved with the cream but wanting a refill    A [employer]  was used for this visit         PMHX:     Patient Active Problem List   Diagnosis     Essential hypertension, malignant     Anoxic brain damage (H)     Health Care Home     Generalized convulsive epilepsy (H)     Intracranial hemorrhage (H)     Major depressive disorder, recurrent episode, moderate (H)     Hemiparesis affecting right side as late effect of cerebrovascular accident (H)     Tobacco dependence syndrome     Alcohol abuse     Adiposity     Drug-induced mood disorder (H)       Current Outpatient Medications   Medication Sig Dispense Refill     amLODIPine (NORVASC) 10 MG tablet Take 1 tablet (10 mg) by mouth daily 90 tablet 3     citalopram (CELEXA) 40 MG tablet Take 1 tablet (40 mg) by mouth daily 90 tablet 3     ketoconazole (NIZORAL) 2 % external cream Apply topically 2 times daily Apply to left arm, neck and chest twice a day 60 g 1     levETIRAcetam (KEPPRA) 1000 MG tablet Take 1 tablet (1,000 mg) by mouth 2 times daily 180 tablet 3     losartan (COZAAR) 100 MG tablet Take 1 tablet (100 mg) by mouth daily 90 tablet 3     metoprolol tartrate (LOPRESSOR) 100 MG tablet Take 1 tablet (100 mg) by mouth 2 times daily 180 tablet 0     order for DME  Equipment being ordered:     Redo right leg AFO    [employer] to assess 1 Device 0     order for DME Equipment being ordered: New AFO brace 1 Units 0       Social History     Socioeconomic History     Marital status: Single     Spouse name: Not on file     Number of children: Not on file     Years of education: Not on file     Highest education level: Not on file   Occupational History     Occupation: disabled   Social Needs     Financial resource strain: Not on file     Food insecurity     Worry: Not on file     Inability: Not on file     Transportation needs     Medical: Not on file     Non-medical: Not on file   Tobacco Use     Smoking status: Current Every Day Smoker     Types: Cigarettes     Smokeless tobacco: Never Used     Tobacco comment: 6-7 per day.    Substance and Sexual Activity     Alcohol use: Yes     Alcohol/week: 0.0 standard drinks     Comment: 6-7 beers daily.      Drug use: No     Sexual activity: Not on file   Lifestyle     Physical activity     Days per week: Not on file     Minutes per session: Not on file     Stress: Not on file   Relationships     Social connections     Talks on phone: Not on file     Gets together: Not on file     Attends [employer] service: Not on file     Active member of club or organization: Not on file     Attends meetings of clubs or organizations: Not on file     Relationship status: Not on file     Intimate partner violence     Fear of current or ex partner: Not on file     Emotionally abused: Not on file     Physically abused: Not on file     Forced sexual activity: Not on file   Other Topics Concern     Parent/sibling w/ CABG, MI or angioplasty before 65F 55M? Not Asked   Social History Narrative    Lives with brother, sister in law and their children        No Known Allergies             Review of Systems:   C: NEGATIVE for fatigue, unexpected change in weight  P: NEGATIVE for changes in mood or affect  R: negative for cough/exposure to covid/concerns  N:  "denies stroke symptoms (weak/paresthesia)            Physical Exam:     Vitals:    01/05/21 1449   BP: 120/83   BP Location: Left arm   Cuff Size: Adult Regular   Pulse: 62   Resp: 16   Temp: 97.7  F (36.5  C)   TempSrc: Oral   SpO2: 96%   Weight: 85.3 kg (188 lb)   Height: 1.676 m (5' 6\")     Body mass index is 30.34 kg/m .    GENERAL APPEARANCE: alert and no distress, post stroke changes and slurred speech but able to commmunicate  RESP: lungs clear to auscultation - no rales, rhonchi or wheezes  CV: regular rate and rhythm,  and no murmur, click,  rub or gallop  NEURO: right facial droop, right arm flexed with hand flexed, right leg with AFO and walks with swinging gait  SKIN: smooth, very slight hypopigmented macules scattered on left arm and chest, improved      Options for treatment and follow-up care were reviewed with the patient and/or guardian. Daisy Dino and/or guardian engaged in the decision making process and verbalized understanding of the options discussed and agreed with the final plan.    Patricia Mason MD      "

## 2021-01-05 NOTE — NURSING NOTE
Due to patient being non-English speaking/uses sign language, an  was used for this visit. Only for face-to-face interpretation by an external agency, date and length of interpretation can be found on the scanned worksheet.     name: Jesse Joyner  Agency: [employer]  Language: Tavo   Telephone number: [phone]   Type of interpretation: Telephone, spoken

## 2021-01-06 NOTE — RESULT ENCOUNTER NOTE
Call patient:    Your kidney function has reduced.  I need a urine sample to help determine what's happening.  Can come in anytime this week or next. I also recommend drinking more water, you need to hydrate more.  Your blood counts are fine.

## 2021-01-07 ENCOUNTER — RECORDS - HEALTHEAST (OUTPATIENT)
Dept: ADMINISTRATIVE | Facility: OTHER | Age: 41
End: 2021-01-07

## 2021-01-07 ENCOUNTER — OFFICE VISIT (OUTPATIENT)
Dept: FAMILY MEDICINE | Facility: CLINIC | Age: 41
End: 2021-01-07
Payer: MEDICARE

## 2021-01-07 ENCOUNTER — RECORDS - HEALTHEAST (OUTPATIENT)
Dept: SCHEDULING | Facility: CLINIC | Age: 41
End: 2021-01-07

## 2021-01-07 VITALS
RESPIRATION RATE: 18 BRPM | DIASTOLIC BLOOD PRESSURE: 87 MMHG | WEIGHT: 188 LBS | BODY MASS INDEX: 30.34 KG/M2 | HEART RATE: 58 BPM | SYSTOLIC BLOOD PRESSURE: 122 MMHG | TEMPERATURE: 98.3 F | OXYGEN SATURATION: 97 %

## 2021-01-07 DIAGNOSIS — R79.89 ELEVATED SERUM CREATININE: ICD-10-CM

## 2021-01-07 DIAGNOSIS — N28.1 RENAL CYST, LEFT: Primary | ICD-10-CM

## 2021-01-07 DIAGNOSIS — R80.9 PROTEINURIA: ICD-10-CM

## 2021-01-07 DIAGNOSIS — R80.9 PROTEINURIA, UNSPECIFIED TYPE: ICD-10-CM

## 2021-01-07 DIAGNOSIS — N28.1 RENAL CYST, LEFT: ICD-10-CM

## 2021-01-07 LAB
BACTERIA: NORMAL
BILIRUBIN UR: NEGATIVE MG/DL
BLOOD UR: ABNORMAL MG/DL
CASTS: NORMAL /LPF
CLARITY, URINE: CLEAR
COLOR UR: YELLOW
CREAT UR-MCNC: 59.7 MG/DL
CRYSTAL URINE: NORMAL /LPF
EPITHELIAL CELLS UR: <2 /LPF (ref 0–2)
GLUCOSE URINE: NEGATIVE
KETONES UR QL: NEGATIVE MG/DL
LEUKOCYTE ESTERASE UR: NEGATIVE
MICROALBUMIN UR-MCNC: 203.93 MG/DL (ref 0–1.99)
MICROALBUMIN/CREAT UR: 3415.9 MG/G
MUCOUS URINE: NORMAL LPF
NITRITE UR QL STRIP: NEGATIVE MG/DL
PH UR STRIP: 6 [PH] (ref 4.5–8)
PROTEIN UR: ABNORMAL MG/DL
RBC URINE: NORMAL /HPF
SP GR UR STRIP: 1.02 (ref 1–1.03)
UROBILINOGEN UR STRIP-ACNC: ABNORMAL E.U./DL
WBC URINE: <2 /HPF

## 2021-01-07 PROCEDURE — 99215 OFFICE O/P EST HI 40 MIN: CPT | Performed by: FAMILY MEDICINE

## 2021-01-07 PROCEDURE — 81003 URINALYSIS AUTO W/O SCOPE: CPT | Performed by: FAMILY MEDICINE

## 2021-01-07 PROCEDURE — 81001 URINALYSIS AUTO W/SCOPE: CPT | Performed by: FAMILY MEDICINE

## 2021-01-07 NOTE — PROGRESS NOTES
Assessment and Plan      (N28.1) Renal cyst, left  (primary encounter diagnosis)  Comment: past ct and ultrasound 2016 showed complex cyst, did not follow up with urology as recommended.  Plan: CT ABDOMEN W CONTRAST (Kidney Stones)        Now concern that he has progression of this condition with worsening renal function    (R79.89) Elevated serum creatinine  Comment: see above, appears controlled HTN, evaluated for inadequate bladder emptying and patient had low postvoid residual in ultrasound  Plan: Urine Microscopic ([facility]), CT ABDOMEN W         CONTRAST (Kidney Stones)            (R80.9) Proteinuria, unspecified type  Comment: see above believe it is related  Plan: CT ABDOMEN W CONTRAST (Kidney Stones)            Results from the last 24 hours   Results from last visit   Results for orders placed or performed in visit on 01/07/21   Urinalysis, Micro If ([facility])     Status: Abnormal   Result Value Ref Range    Specific Gravity Urine 1.020 1.005 - 1.030    pH Urine 6.0 4.5 - 8.0    Leukocyte Esterase UR Negative NEGATIVE    Nitrite Urine Negative NEGATIVE mg/dL    Protein UR 3+ (A) NEGATIVE mg/dL    Glucose Urine Negative NEGATIVE    Ketones Urine Negative NEGATIVE mg/dL    Urobilinogen mg/dL 0.2 E.U./dL 0.2 E.U./dL E.U./dL    Bilirubin UR Negative NEGATIVE mg/dL    Blood UR 3+ (A) NEGATIVE mg/dL    Color Urine Yellow     Clarity, urine Clear    Microalbumin Creatinine Ratio Random Ur ([facility])     Status: Abnormal   Result Value Ref Range    Microalbumin, Urine 203.93 (H) 0.00 - 1.99 mg/dL    Creatinine, Urine 59.7 mg/dL    Albumin Urine mg/g Cr 3,415.9 (H) <=19.9 mg/g    Narrative    Test performed by:  [facility]  [address] [place] [address]  Microalbumin, Random Urine  <2.0 mg/dL . . . . . . . . Normal  3.0-30.0 mg/dL . . . . . . Microalbuminuria  >30.0 mg/dL . . . . . .  . Clinical Proteinuria  Microalbumin/Creatinine Ratio, Random Urine  <20 mg/g . . . . .. . .  . Normal   mg/g . . . . . . . Microalbuminuria  >300 mg/g . . . . . . . . Clinical Proteinuria   Urine Microscopic ([facility])     Status: None   Result Value Ref Range    WBC Urine <2 <5 /hpf    RBC Urine 10-25 <5 /hpf    Epithelial Cells UR <2 0 - 2 /lpf    Mucous Urine Few NONE lpf    Casts Urine RBC cast NONE /lpf    Crystal Urine None NONE /lpf    Bacteria Wet Prep Moderate None    Narrative    yeast also present              HPI:       Daisy Sun is a 36 year old  male with a significant past medical history of hypertension who presents for follow up of concern(s) listed below    Chief Complaint   Patient presents with     Labs Only       1. Kidney function  -does notice has had difficulty with urinating,  Perhaps a year has had to stand to urinate, unable to urinate while sitting  -still strong stream  -has nocturia: about 2 times/night   Had slight increase in GFR  -following recommendation and here today for lab work, denies any complaints      A [employer]  was used for this visit         PMHX:     Patient Active Problem List   Diagnosis     Essential hypertension, malignant     Anoxic brain damage (H)     Health Care Home     Generalized convulsive epilepsy (H)     Intracranial hemorrhage (H)     Major depressive disorder, recurrent episode, moderate (H)     Hemiparesis affecting right side as late effect of cerebrovascular accident (H)     Tobacco dependence syndrome     Alcohol abuse     Adiposity     Drug-induced mood disorder (H)       Current Outpatient Medications   Medication Sig Dispense Refill     amLODIPine (NORVASC) 10 MG tablet Take 1 tablet (10 mg) by mouth daily 90 tablet 3     citalopram (CELEXA) 40 MG tablet Take 1 tablet (40 mg) by mouth daily 90 tablet 3     ketoconazole (NIZORAL) 2 % external cream Apply topically 2 times daily Apply to left arm, neck and chest twice a day 60 g 1     levETIRAcetam (KEPPRA) 1000 MG tablet Take 1 tablet (1,000 mg) by mouth 2 times daily 180 tablet 3      losartan (COZAAR) 100 MG tablet Take 1 tablet (100 mg) by mouth daily 90 tablet 3     metoprolol tartrate (LOPRESSOR) 100 MG tablet Take 1 tablet (100 mg) by mouth 2 times daily 180 tablet 0     order for DME Equipment being ordered:     Redo right leg AFO    [employer] to assess 1 Device 0     order for DME Equipment being ordered: New AFO brace 1 Units 0       Social History     Socioeconomic History     Marital status: Single     Spouse name: Not on file     Number of children: Not on file     Years of education: Not on file     Highest education level: Not on file   Occupational History     Occupation: disabled   Social Needs     Financial resource strain: Not on file     Food insecurity     Worry: Not on file     Inability: Not on file     Transportation needs     Medical: Not on file     Non-medical: Not on file   Tobacco Use     Smoking status: Current Every Day Smoker     Types: Cigarettes     Smokeless tobacco: Never Used     Tobacco comment: 6-7 per day.    Substance and Sexual Activity     Alcohol use: Yes     Alcohol/week: 0.0 standard drinks     Comment: 6-7 beers daily.      Drug use: No     Sexual activity: Not on file   Lifestyle     Physical activity     Days per week: Not on file     Minutes per session: Not on file     Stress: Not on file   Relationships     Social connections     Talks on phone: Not on file     Gets together: Not on file     Attends Sikhism service: Not on file     Active member of club or organization: Not on file     Attends meetings of clubs or organizations: Not on file     Relationship status: Not on file     Intimate partner violence     Fear of current or ex partner: Not on file     Emotionally abused: Not on file     Physically abused: Not on file     Forced sexual activity: Not on file   Other Topics Concern     Parent/sibling w/ CABG, MI or angioplasty before 65F 55M? Not Asked   Social History Narrative    Lives with brother, sister in law and their  children        No Known Allergies             Review of Systems:   C: NEGATIVE for fatigue, unexpected change in weight  P: NEGATIVE for changes in mood or affect  R: negative for cough/exposure to covid/concerns  N: denies stroke symptoms (weak/paresthesia)            Physical Exam:     Vitals:    01/07/21 1040   BP: 122/87   Pulse: 58   Resp: 18   Temp: 98.3  F (36.8  C)   TempSrc: Oral   SpO2: 97%   Weight: 85.3 kg (188 lb)     Body mass index is 30.34 kg/m .    GENERAL APPEARANCE: alert and no distress, post stroke changes and slurred speech but able to commmunicate  RESP: lungs clear to auscultation - no rales, rhonchi or wheezes  CV: regular rate and rhythm,  and no murmur, click,  rub or gallop  ABD: no palpable abnormality,   -ultrasound of bladder normal  Bladder post void measures 2.2 X 1.8 X 1.6  Options for treatment and follow-up care were reviewed with the patient and/or guardian. Daisy Dino and/or guardian engaged in the decision making process and verbalized understanding of the options discussed and agreed with the final plan.    Patricia Mason MD

## 2021-01-07 NOTE — PATIENT INSTRUCTIONS
Referral for :     CT abdomen    LOCATION/PLACE/Provider :    [facility]   DATE & TIME :    Jan. 18th at 4:10 check in   PHONE :     [phone]  FAX :    [phone]  Appointment made by clinic staff/:    Kayy

## 2021-01-07 NOTE — NURSING NOTE
name: Anna Her  Language: Hmong  Agency: [facility]  Phone number: [phone]     Anti-reflective

## 2021-03-22 DIAGNOSIS — I10 ESSENTIAL HYPERTENSION, MALIGNANT: ICD-10-CM

## 2021-03-22 RX ORDER — METOPROLOL TARTRATE 100 MG
100 TABLET ORAL 2 TIMES DAILY
Qty: 180 TABLET | Refills: 3 | Status: SHIPPED | OUTPATIENT
Start: 2021-03-22 | End: 2022-04-07

## 2021-06-02 ENCOUNTER — RECORDS - HEALTHEAST (OUTPATIENT)
Dept: ADMINISTRATIVE | Facility: CLINIC | Age: 41
End: 2021-06-02

## 2022-01-05 DIAGNOSIS — I10 ESSENTIAL HYPERTENSION, MALIGNANT: ICD-10-CM

## 2022-01-05 DIAGNOSIS — F33.1 MAJOR DEPRESSIVE DISORDER, RECURRENT EPISODE, MODERATE (H): ICD-10-CM

## 2022-01-05 DIAGNOSIS — G40.909 SEIZURE DISORDER (H): ICD-10-CM

## 2022-01-05 RX ORDER — AMLODIPINE BESYLATE 10 MG/1
10 TABLET ORAL DAILY
Qty: 90 TABLET | Refills: 3 | Status: SHIPPED | OUTPATIENT
Start: 2022-01-05 | End: 2023-01-23

## 2022-01-05 RX ORDER — LEVETIRACETAM 1000 MG/1
1000 TABLET ORAL 2 TIMES DAILY
Qty: 180 TABLET | Refills: 3 | Status: SHIPPED | OUTPATIENT
Start: 2022-01-05 | End: 2023-01-23

## 2022-01-05 RX ORDER — LOSARTAN POTASSIUM 100 MG/1
100 TABLET ORAL DAILY
Qty: 90 TABLET | Refills: 3 | Status: SHIPPED | OUTPATIENT
Start: 2022-01-05 | End: 2023-01-16

## 2022-01-05 RX ORDER — CITALOPRAM HYDROBROMIDE 40 MG/1
40 TABLET ORAL DAILY
Qty: 90 TABLET | Refills: 3 | Status: SHIPPED | OUTPATIENT
Start: 2022-01-05 | End: 2023-01-23

## 2022-01-05 NOTE — TELEPHONE ENCOUNTER
Needs visit has been one year.  Due for lab work and vaccines.  Will refill X 1 all of meds but please schedule.

## 2022-04-07 ENCOUNTER — APPOINTMENT (OUTPATIENT)
Dept: INTERPRETER SERVICES | Facility: CLINIC | Age: 42
End: 2022-04-07
Payer: MEDICARE

## 2022-04-07 ENCOUNTER — TELEPHONE (OUTPATIENT)
Dept: FAMILY MEDICINE | Facility: CLINIC | Age: 42
End: 2022-04-07
Payer: MEDICARE

## 2022-04-07 DIAGNOSIS — I10 ESSENTIAL HYPERTENSION, MALIGNANT: ICD-10-CM

## 2022-04-07 RX ORDER — METOPROLOL TARTRATE 100 MG
100 TABLET ORAL 2 TIMES DAILY
Qty: 180 TABLET | Refills: 3 | Status: SHIPPED | OUTPATIENT
Start: 2022-04-07 | End: 2023-05-01

## 2022-04-07 NOTE — TELEPHONE ENCOUNTER
Message to physician:     Date of last visit: 1/7/2021    Date of next visit if scheduled: none    Potassium   Date Value Ref Range Status   01/05/2021 4.6 3.4 - 5.3 mmol/L Final     Creatinine   Date Value Ref Range Status   01/05/2021 1.8 (H) 0.8 - 1.5 mg/dL Final     GFR Estimate   Date Value Ref Range Status   12/07/2020 45 (L) >60 mL/min/1.73m2 Final       BP Readings from Last 3 Encounters:   01/07/21 122/87   01/05/21 120/83   12/07/20 125/85       Hemoglobin A1C POCT   Date Value Ref Range Status   08/17/2016 5.3 4.1 - 5.7 % Final       Please complete refill and CLOSE ENCOUNTER.  Closing the encounter signifies the refill is complete.

## 2022-04-18 ENCOUNTER — OFFICE VISIT (OUTPATIENT)
Dept: FAMILY MEDICINE | Facility: CLINIC | Age: 42
End: 2022-04-18
Payer: MEDICARE

## 2022-04-18 VITALS
WEIGHT: 200 LBS | HEART RATE: 100 BPM | DIASTOLIC BLOOD PRESSURE: 112 MMHG | BODY MASS INDEX: 32.28 KG/M2 | OXYGEN SATURATION: 99 % | RESPIRATION RATE: 16 BRPM | SYSTOLIC BLOOD PRESSURE: 149 MMHG

## 2022-04-18 DIAGNOSIS — N18.32 STAGE 3B CHRONIC KIDNEY DISEASE (H): ICD-10-CM

## 2022-04-18 DIAGNOSIS — R80.9 PROTEINURIA, UNSPECIFIED TYPE: ICD-10-CM

## 2022-04-18 DIAGNOSIS — G40.909 SEIZURE DISORDER (H): ICD-10-CM

## 2022-04-18 DIAGNOSIS — I10 ESSENTIAL HYPERTENSION, MALIGNANT: Primary | ICD-10-CM

## 2022-04-18 DIAGNOSIS — I69.351 HEMIPARESIS AFFECTING RIGHT SIDE AS LATE EFFECT OF CEREBROVASCULAR ACCIDENT (H): ICD-10-CM

## 2022-04-18 PROBLEM — D58.2 ELEVATED HEMOGLOBIN (H): Status: ACTIVE | Noted: 2022-04-18

## 2022-04-18 LAB
ALBUMIN MFR UR ELPH: 388.3 MG/DL
ALBUMIN SERPL-MCNC: 3.4 G/DL (ref 3.5–5)
ALBUMIN UR-MCNC: >=300 MG/DL
ALP SERPL-CCNC: 133 U/L (ref 45–120)
ALT SERPL W P-5'-P-CCNC: 30 U/L (ref 0–45)
ANION GAP SERPL CALCULATED.3IONS-SCNC: 13 MMOL/L (ref 5–18)
APPEARANCE UR: CLEAR
AST SERPL W P-5'-P-CCNC: 28 U/L (ref 0–40)
BACTERIA #/AREA URNS HPF: ABNORMAL /HPF
BILIRUB SERPL-MCNC: 0.4 MG/DL (ref 0–1)
BILIRUB UR QL STRIP: NEGATIVE
BUN SERPL-MCNC: 38 MG/DL (ref 8–22)
CALCIUM SERPL-MCNC: 8.7 MG/DL (ref 8.5–10.5)
CHLORIDE BLD-SCNC: 114 MMOL/L (ref 98–107)
CO2 SERPL-SCNC: 10 MMOL/L (ref 22–31)
COLOR UR AUTO: YELLOW
CREAT SERPL-MCNC: 2.51 MG/DL (ref 0.7–1.3)
CREAT UR-MCNC: 82 MG/DL
GFR SERPL CREATININE-BSD FRML MDRD: 32 ML/MIN/1.73M2
GLUCOSE BLD-MCNC: 91 MG/DL (ref 70–125)
GLUCOSE UR STRIP-MCNC: NEGATIVE MG/DL
HGB UR QL STRIP: ABNORMAL
KETONES UR STRIP-MCNC: NEGATIVE MG/DL
LEUKOCYTE ESTERASE UR QL STRIP: NEGATIVE
NITRATE UR QL: NEGATIVE
PH UR STRIP: 5.5 [PH] (ref 5–7)
POTASSIUM BLD-SCNC: 3.7 MMOL/L (ref 3.5–5)
PROT SERPL-MCNC: 8.4 G/DL (ref 6–8)
PROT/CREAT 24H UR: 4.74 MG/MG CR
RBC #/AREA URNS AUTO: ABNORMAL /HPF
SODIUM SERPL-SCNC: 137 MMOL/L (ref 136–145)
SP GR UR STRIP: 1.02 (ref 1–1.03)
UROBILINOGEN UR STRIP-ACNC: 0.2 E.U./DL
WBC #/AREA URNS AUTO: ABNORMAL /HPF
YEAST #/AREA URNS HPF: ABNORMAL /HPF

## 2022-04-18 PROCEDURE — 84156 ASSAY OF PROTEIN URINE: CPT | Performed by: STUDENT IN AN ORGANIZED HEALTH CARE EDUCATION/TRAINING PROGRAM

## 2022-04-18 PROCEDURE — 80053 COMPREHEN METABOLIC PANEL: CPT | Performed by: STUDENT IN AN ORGANIZED HEALTH CARE EDUCATION/TRAINING PROGRAM

## 2022-04-18 PROCEDURE — 81001 URINALYSIS AUTO W/SCOPE: CPT | Performed by: STUDENT IN AN ORGANIZED HEALTH CARE EDUCATION/TRAINING PROGRAM

## 2022-04-18 PROCEDURE — 36415 COLL VENOUS BLD VENIPUNCTURE: CPT | Performed by: STUDENT IN AN ORGANIZED HEALTH CARE EDUCATION/TRAINING PROGRAM

## 2022-04-18 PROCEDURE — 99214 OFFICE O/P EST MOD 30 MIN: CPT | Mod: GC | Performed by: STUDENT IN AN ORGANIZED HEALTH CARE EDUCATION/TRAINING PROGRAM

## 2022-04-18 NOTE — PROGRESS NOTES
Preceptor Attestation:  Patient's case reviewed and discussed with Aaron Arellano MD resident and I evaluated the patient. I agree with written assessment and plan of care.  Supervising Physician:  KOTA ORTEGA MD  [facility]

## 2022-04-18 NOTE — PATIENT INSTRUCTIONS
Resume taking your blood pressure medications.   Lab work today. We will call with the results.   Referral for nephrology placed.   I will place an order for a CT scan of your kidneys after we know what your kidney function is.   Follow up in 1 week. Please bring all of your medications and your brother to that visit if possible.

## 2022-04-18 NOTE — PROGRESS NOTES
ASSESSMENT/PLAN:     Essential hypertension, malignant  Stage 3b chronic kidney disease (H)  Proteinuria, unspecified type  Patient with history of poorly controlled HTN, worsening renal function and proteinuria. Has nephrotic range proteinuria that was noted 1 year ago and patient did not follow up. Also with noted hematuria raising concern for nephritic syndrome however has known renal cysts that could be malignant based on US in 2016. Had a CT scan that was ordered however patient did have this completed. Will reorder his CT scan once his kidney function is known. Repeat UPCR today and will have him follow up with nephrology given the nephrotic range protein. Will also see where kidney function is today. BP elevated today and did not take his medications, brother picked up his medications yesterday. Follow up in 1 week.  - Comprehensive metabolic panel  - Protein  random urine  - UA reflex to Microscopic  - Comprehensive metabolic panel  - Protein  random urine  - UA reflex to Microscopic  - Adult Nephrology  Referral  - Urine Microscopic Exam    Hemiparesis affecting right side as late effect of cerebrovascular accident (H)  Seizure disorder (H)  History of hemorraghic stroke related to HTN with resultant right sided deficits. Takes keppra for seizure disorder and this was refilled yesterday.        Patient Instructions   Resume taking your blood pressure medications.   Lab work today. We will call with the results.   Referral for nephrology placed.   I will place an order for a CT scan of your kidneys after we know what your kidney function is.   Follow up in 1 week. Please bring all of your medications and your brother to that visit if possible.          Schedule follow-up appointment in 1 week(s).      There are no discontinued medications.    Aaron Arellano MD  [facility] Resident  Pager# [phone]    Precepted with: Patircia Mason MD    Options for treatment and follow-up care were  reviewed with the patient and/or guardian. Daisy Sun and/or guardian engaged in the decision making process and verbalized understanding of the options discussed and agreed with the final plan    CHIEF COMPLAINT                                                      Chief Complaint   Patient presents with     Refill Request     Medication refill       SUBJECTIVE:                                                    Daisy Sun is a 41 year old year old male who presents to clinic today for the following health issues:    Ran out of blood pressure medication 1 month ago.   Needs refills of all medications today  Blood pressure typically 150 systolic, sometimes can be as high as 180  Denies headaches or visual changes   No chest pain or shortness of breath  No swelling  Previously was having nurse visits for medication management.     Patient is an established patient of this clinic.    A [employer]  was used for this visit  ----------------------------------------------------------------------------------------------------------------------  Patient Active Problem List   Diagnosis     Essential hypertension, malignant     Anoxic brain damage (H)     Health Care Home     Generalized convulsive epilepsy (H)     Intracranial hemorrhage (H)     Major depressive disorder, recurrent episode, moderate (H)     Hemiparesis affecting right side as late effect of cerebrovascular accident (H)     Tobacco dependence syndrome     Alcohol abuse     Adiposity     Elevated hemoglobin (H)     Past Surgical History:   Procedure Laterality Date     NO HISTORY OF SURGERY         Social History     Tobacco Use     Smoking status: Current Every Day Smoker     Types: Cigarettes     Smokeless tobacco: Never Used     Tobacco comment: 6-7 per day.    Substance Use Topics     Alcohol use: Yes     Alcohol/week: 0.0 standard drinks     Comment: 6-7 beers daily.      No family history on file.      Problem list and past medical, surgical, social, and  family histories reviewed & adjusted, as indicated.    Current Outpatient Medications   Medication Sig Dispense Refill     amLODIPine (NORVASC) 10 MG tablet Take 1 tablet (10 mg) by mouth daily 90 tablet 3     citalopram (CELEXA) 40 MG tablet Take 1 tablet (40 mg) by mouth daily 90 tablet 3     ketoconazole (NIZORAL) 2 % external cream Apply topically 2 times daily Apply to left arm, neck and chest twice a day 60 g 1     levETIRAcetam (KEPPRA) 1000 MG tablet Take 1 tablet (1,000 mg) by mouth 2 times daily 180 tablet 3     losartan (COZAAR) 100 MG tablet Take 1 tablet (100 mg) by mouth daily 90 tablet 3     metoprolol tartrate (LOPRESSOR) 100 MG tablet Take 1 tablet (100 mg) by mouth 2 times daily 180 tablet 3     order for DME Equipment being ordered:     Redo right leg AFO    [employer] to assess 1 Device 0     order for DME Equipment being ordered: New AFO brace 1 Units 0     Medication list reviewed and updated as indicated.    No Known Allergies  Allergies reviewed and updated as indicated.  ----------------------------------------------------------------------------------------------------------------------  ROS:     ROS: 10 point ROS neg other than the symptoms noted above in the HPI.    OBJECTIVE:     BP (!) 149/112   Pulse 100   Resp 16   Wt 90.7 kg (200 lb)   SpO2 99%   BMI 32.28 kg/m    Body mass index is 32.28 kg/m .  GENERAL: Appears well and in no acute distress.  CARDIOVASCULAR: Regular rate and rhythm, normal S1 and S2 without murmur. No extra heartsounds or friction rub.   RESPIRATORY: Lungs clear to auscultation bilaterally. No wheezing or crackles. No prolonged expiration. Symmetrical chest rise.  MSK: No gross extremity deformities. No peripheral edema. Normal muscle bulk.    Diagnostic Test Results:  Results for orders placed or performed in visit on 04/18/22 (from the past 24 hour(s))   UA reflex to Microscopic   Result Value Ref Range    Color Urine Yellow Colorless, Straw, Light  Yellow, Yellow    Appearance Urine Clear Clear    Glucose Urine Negative Negative mg/dL    Bilirubin Urine Negative Negative    Ketones Urine Negative Negative mg/dL    Specific Gravity Urine 1.020 1.003 - 1.035    Blood Urine Large (A) Negative    pH Urine 5.5 5.0 - 7.0    Protein Albumin Urine >=300 (A) Negative mg/dL    Urobilinogen Urine 0.2 0.2, 1.0 E.U./dL    Nitrite Urine Negative Negative    Leukocyte Esterase Urine Negative Negative

## 2022-04-19 DIAGNOSIS — Z11.59 ENCOUNTER FOR SCREENING FOR OTHER VIRAL DISEASES: ICD-10-CM

## 2022-04-19 DIAGNOSIS — R77.8 ELEVATED TOTAL PROTEIN: Primary | ICD-10-CM

## 2022-04-19 DIAGNOSIS — N28.1 RENAL CYST, LEFT: ICD-10-CM

## 2022-04-19 DIAGNOSIS — N18.32 STAGE 3B CHRONIC KIDNEY DISEASE (H): ICD-10-CM

## 2022-04-19 RX ORDER — SODIUM BICARBONATE 650 MG/1
650 TABLET ORAL 2 TIMES DAILY
Qty: 60 TABLET | Refills: 0 | Status: SHIPPED | OUTPATIENT
Start: 2022-04-19 | End: 2022-06-07

## 2022-04-19 NOTE — RESULT ENCOUNTER NOTE
Please call patient with the following results and schedule for a lab only visit. GFR continues to decline. CO2 level low and would benefit from sodium bicarb. Has elevated gamma gap in setting of worsening kidney function so will obtain SPEP, UPEP to rule out MM, HIV, Hep B, Hep C. CT renal cyst ordered to follow up on hemorrhagic cyst vs neoplasm in 2016.     Hi Daisy,   Your lab work has returned. Your kidney function continues to get worse and you continue to have protein in the urine. For this I have prescribed a new medication called Sodium Bicarbonate and it is very important to follow up with nephrology like we discussed. There was also a slightly elevated level of protein in your blood and I would like to follow this up with some more lab work so please come into clinic to get this done as soon as you can. We also need to get that CT scan to follow up the cyst on your kidney and this has been ordered as well. Let me know if you have any questions but we will follow all of this at your next appointment.     Aaron Arellano MD

## 2022-04-25 ENCOUNTER — APPOINTMENT (OUTPATIENT)
Dept: INTERPRETER SERVICES | Facility: CLINIC | Age: 42
End: 2022-04-25
Payer: MEDICARE

## 2022-04-29 ENCOUNTER — APPOINTMENT (OUTPATIENT)
Dept: INTERPRETER SERVICES | Facility: CLINIC | Age: 42
End: 2022-04-29
Payer: MEDICARE

## 2022-06-03 ENCOUNTER — HOSPITAL ENCOUNTER (OUTPATIENT)
Dept: CT IMAGING | Facility: HOSPITAL | Age: 42
Discharge: HOME | End: 2022-06-03
Attending: FAMILY MEDICINE | Admitting: FAMILY MEDICINE
Payer: MEDICARE

## 2022-06-03 DIAGNOSIS — N28.1 RENAL CYST, LEFT: ICD-10-CM

## 2022-06-03 LAB
CREAT BLD-MCNC: 2.6 MG/DL (ref 0.7–1.3)
GFR SERPL CREATININE-BSD FRML MDRD: 30 ML/MIN/1.73M2

## 2022-06-03 PROCEDURE — G1004 CDSM NDSC: HCPCS

## 2022-06-03 PROCEDURE — 82565 ASSAY OF CREATININE: CPT

## 2022-06-03 PROCEDURE — 250N000011 HC RX IP 250 OP 636: Performed by: FAMILY MEDICINE

## 2022-06-03 RX ORDER — IOPAMIDOL 755 MG/ML
75 INJECTION, SOLUTION INTRAVASCULAR ONCE
Status: COMPLETED | OUTPATIENT
Start: 2022-06-03 | End: 2022-06-03

## 2022-06-03 RX ADMIN — IOPAMIDOL 75 ML: 755 INJECTION, SOLUTION INTRAVENOUS at 14:46

## 2022-06-03 NOTE — RESULT ENCOUNTER NOTE
Please call patient with the following results and schedule a follow up appointment for him when able.     Hi Daisy,   Your CT scan has returned and the mass they saw on your left kidney was benign and not cancerous. No further follow up is needed of this but we would like to see you back in clinic as soon as possible to follow up your blood pressure.     Thanks,  Aaron Arellano MD

## 2022-06-07 DIAGNOSIS — N18.32 STAGE 3B CHRONIC KIDNEY DISEASE (H): ICD-10-CM

## 2022-06-07 RX ORDER — SODIUM BICARBONATE 650 MG/1
650 TABLET ORAL 2 TIMES DAILY
Qty: 180 TABLET | Refills: 3 | Status: SHIPPED | OUTPATIENT
Start: 2022-06-07 | End: 2024-03-12

## 2022-06-15 ENCOUNTER — OFFICE VISIT (OUTPATIENT)
Dept: FAMILY MEDICINE | Facility: CLINIC | Age: 42
End: 2022-06-15
Payer: MEDICARE

## 2022-06-15 VITALS
BODY MASS INDEX: 31.47 KG/M2 | SYSTOLIC BLOOD PRESSURE: 124 MMHG | WEIGHT: 195 LBS | RESPIRATION RATE: 20 BRPM | DIASTOLIC BLOOD PRESSURE: 86 MMHG | OXYGEN SATURATION: 98 % | TEMPERATURE: 98.6 F

## 2022-06-15 DIAGNOSIS — N18.32 STAGE 3B CHRONIC KIDNEY DISEASE (H): ICD-10-CM

## 2022-06-15 DIAGNOSIS — Z11.59 ENCOUNTER FOR SCREENING FOR OTHER VIRAL DISEASES: ICD-10-CM

## 2022-06-15 DIAGNOSIS — I10 ESSENTIAL HYPERTENSION, MALIGNANT: Primary | ICD-10-CM

## 2022-06-15 DIAGNOSIS — R77.8 ELEVATED TOTAL PROTEIN: ICD-10-CM

## 2022-06-15 DIAGNOSIS — R80.9 PROTEINURIA, UNSPECIFIED TYPE: ICD-10-CM

## 2022-06-15 LAB
ALBUMIN SERPL-MCNC: 3.5 G/DL (ref 3.5–5)
ALP SERPL-CCNC: 175 U/L (ref 45–120)
ALT SERPL W P-5'-P-CCNC: 210 U/L (ref 0–45)
ANION GAP SERPL CALCULATED.3IONS-SCNC: 11 MMOL/L (ref 5–18)
AST SERPL W P-5'-P-CCNC: 485 U/L (ref 0–40)
BILIRUB SERPL-MCNC: 0.6 MG/DL (ref 0–1)
BUN SERPL-MCNC: 36 MG/DL (ref 8–22)
CALCIUM SERPL-MCNC: 8.5 MG/DL (ref 8.5–10.5)
CHLORIDE BLD-SCNC: 117 MMOL/L (ref 98–107)
CO2 SERPL-SCNC: 10 MMOL/L (ref 22–31)
CREAT SERPL-MCNC: 1.25 MG/DL (ref 0.7–1.3)
GFR SERPL CREATININE-BSD FRML MDRD: 73 ML/MIN/1.73M2
GLUCOSE BLD-MCNC: 86 MG/DL (ref 70–125)
HIV 1+2 AB+HIV1 P24 AG SERPL QL IA: NEGATIVE
POTASSIUM BLD-SCNC: 4.9 MMOL/L (ref 3.5–5)
PROT SERPL-MCNC: 7.7 G/DL (ref 6–8)
SODIUM SERPL-SCNC: 138 MMOL/L (ref 136–145)

## 2022-06-15 PROCEDURE — 86038 ANTINUCLEAR ANTIBODIES: CPT | Performed by: STUDENT IN AN ORGANIZED HEALTH CARE EDUCATION/TRAINING PROGRAM

## 2022-06-15 PROCEDURE — 87389 HIV-1 AG W/HIV-1&-2 AB AG IA: CPT | Performed by: STUDENT IN AN ORGANIZED HEALTH CARE EDUCATION/TRAINING PROGRAM

## 2022-06-15 PROCEDURE — 99214 OFFICE O/P EST MOD 30 MIN: CPT | Mod: GC | Performed by: STUDENT IN AN ORGANIZED HEALTH CARE EDUCATION/TRAINING PROGRAM

## 2022-06-15 PROCEDURE — 84166 PROTEIN E-PHORESIS/URINE/CSF: CPT | Performed by: PATHOLOGY

## 2022-06-15 PROCEDURE — 86803 HEPATITIS C AB TEST: CPT | Performed by: STUDENT IN AN ORGANIZED HEALTH CARE EDUCATION/TRAINING PROGRAM

## 2022-06-15 PROCEDURE — 80053 COMPREHEN METABOLIC PANEL: CPT | Performed by: STUDENT IN AN ORGANIZED HEALTH CARE EDUCATION/TRAINING PROGRAM

## 2022-06-15 PROCEDURE — 36415 COLL VENOUS BLD VENIPUNCTURE: CPT | Performed by: STUDENT IN AN ORGANIZED HEALTH CARE EDUCATION/TRAINING PROGRAM

## 2022-06-15 PROCEDURE — 84165 PROTEIN E-PHORESIS SERUM: CPT | Performed by: PATHOLOGY

## 2022-06-15 PROCEDURE — 86706 HEP B SURFACE ANTIBODY: CPT | Performed by: STUDENT IN AN ORGANIZED HEALTH CARE EDUCATION/TRAINING PROGRAM

## 2022-06-15 PROCEDURE — 87340 HEPATITIS B SURFACE AG IA: CPT | Performed by: STUDENT IN AN ORGANIZED HEALTH CARE EDUCATION/TRAINING PROGRAM

## 2022-06-15 PROCEDURE — 86704 HEP B CORE ANTIBODY TOTAL: CPT | Performed by: STUDENT IN AN ORGANIZED HEALTH CARE EDUCATION/TRAINING PROGRAM

## 2022-06-15 ASSESSMENT — PATIENT HEALTH QUESTIONNAIRE - PHQ9: SUM OF ALL RESPONSES TO PHQ QUESTIONS 1-9: 0

## 2022-06-15 NOTE — PROGRESS NOTES
Due to patient being non-English speaking/uses sign language, an  was used for this visit. Only for face-to-face interpretation by an external agency, date and length of interpretation can be found on the scanned worksheet.     name: Faye  Agency: [employer]  Language: Tavo   Telephone number: [phone]  Type of interpretation: Face-to-face, spoken

## 2022-06-15 NOTE — PROGRESS NOTES
I have personally reviewed the history and examination as documented by Dr. Arellano.  I was present during key portions of the visit and agree with the assessment and plan as documented for 41 yr old male with hx of hemorrhagic stroke here for BP follow-up. At goal on meds. Kidney function significantly worse w/ nephrotic range proteinuria. Nephrology referral placed. Additional labs ordered. Precautions given. Anticipatory guidance given.     Yossi Garcia MD  Annabel 15, 2022  3:17 PM

## 2022-06-15 NOTE — LETTER
June 20, 2022      Daisy Sun  [address] [place] [address]        Dear ,    We are writing to inform you of your test results.    {results letter list:[id number]}    Resulted Orders   Protein electrophoresis random urine   Result Value Ref Range    Total Protein Random Urine 259 mg/dL    ELP Interpretation Urine Unremarkable protein electrophoresis.       Path ICD: R77.8     Reviewing Pathologist Jovana Tony MD    HIV Ag/Ab Screen Cascade   Result Value Ref Range    HIV Antigen Antibody Combo Negative Negative   Hepatitis C antibody   Result Value Ref Range    Hepatitis C Antibody Negative Negative    Narrative    Assay performance characteristics have not been established for newborns, infants, children (<18 years) or populations of immunocompromised or immunosuppressed patients.    Hepatitis B core antibody   Result Value Ref Range    Hepatitis B Core Antibody Total Positive (A) Negative   Hepatitis B Surface Antibody   Result Value Ref Range    Hepatitis B Surface Antibody Positive (A) Negative   Hepatitis B surface antigen   Result Value Ref Range    Hepatitis B Surface Antigen Nonreactive Nonreactive   Comprehensive metabolic panel   Result Value Ref Range    Sodium 138 136 - 145 mmol/L    Potassium 4.9 3.5 - 5.0 mmol/L    Chloride 117 (H) 98 - 107 mmol/L    Carbon Dioxide (CO2) 10 (L) 22 - 31 mmol/L    Anion Gap 11 5 - 18 mmol/L    Urea Nitrogen 36 (H) 8 - 22 mg/dL    Creatinine 1.25 0.70 - 1.30 mg/dL    Calcium 8.5 8.5 - 10.5 mg/dL    Glucose 86 70 - 125 mg/dL    Alkaline Phosphatase 175 (H) 45 - 120 U/L     (H) 0 - 40 U/L     (H) 0 - 45 U/L    Protein Total 7.7 6.0 - 8.0 g/dL    Albumin 3.5 3.5 - 5.0 g/dL    Bilirubin Total 0.6 0.0 - 1.0 mg/dL    GFR Estimate 73 >60 mL/min/1.73m2      Comment:      Effective December 21, 2021 eGFRcr in adults is calculated using the 2021 CKD-EPI creatinine equation which includes age and gender (Ruth et al., NEJM, DOI: 10.1056/WJEVqs3771672)    Anti Nuclear Cielo IgG by IFA with Reflex   Result Value Ref Range    YENIFER interpretation Negative Negative      Comment:        Negative:              <1:40  Borderline Positive:   1:40 - 1:80  Positive:              >1:80   Total Protein, Serum for ELP   Result Value Ref Range    Total Protein Serum for ELP 7.7 6.0 - 8.0 g/dL   Protein Electrophoresis, Serum   Result Value Ref Range    Albumin % 59.3 51.0 - 67.0 %    Albumin 4.6 3.2 - 4.7 g/dL    Alpha 1 % 2.5 2.0 - 4.0 %    Alpha 1 0.2 0.1 - 0.3 g/dL    Alpha 2 % 8.6 5.0 - 13.0 %    Alpha 2 0.7 0.4 - 0.9 g/dL    Beta % 13.6 10.0 - 17.0 %    Beta Globulin 1.0 0.7 - 1.2 g/dL    Gamma Globulin % 16.0 9.0 - 20.0 %    Gamma Globulin 1.2 0.6 - 1.4 g/dL    ELP Interpretation Unremarkable protein electrophoresis.       Path ICD: R77.8     Reviewing Pathologist Jovana Tony MD     Total Protein Serum for ELP 7.7 g/dL       If you have any questions or concerns, please call the clinic at the number listed above.       Sincerely,      Patricia Mason MD

## 2022-06-15 NOTE — PROGRESS NOTES
ASSESSMENT/PLAN:     Essential hypertension, malignant  Here today for BP follow up. Reports that he has been taking his medications and when he does blood pressure is < 130 systolic. At goal today in clinic.   -Contineu losartan, metoprolol and amlodipine    Stage 3b chronic kidney disease (H)  Proteinuria  Creatinine worsening at last visit. Recheck CMP today. Had nephrotic range proteinuria previously and on maximally tolerated ARB. Will follow up with patient's brother to make sure that he follows up with nephrology. For proteinuria suspect related to uncontrolled HTN but given the significant elevation will pursue further workup as below and add on YENIFER today. Renal CT showed multiple benign cysts, no follow up indicated.   - Comprehensive metabolic panel  - Anti Nuclear Cielo IgG by IFA with Reflex    Elevated total protein  Elevated total protein at last visit noted as well. Plan to check SPEP, UPEP, Hep C, Hep B,          Schedule follow-up appointment in 1 month(s).      There are no discontinued medications.    Aaron Arellano MD  [facility] [place] Resident  Pager# [phone]    Precepted with: Dr. Garcia    Options for treatment and follow-up care were reviewed with the patient and/or guardian. Daisy Sun and/or guardian engaged in the decision making process and verbalized understanding of the options discussed and agreed with the final plan    CHIEF COMPLAINT                                                      Chief Complaint   Patient presents with     Follow Up     BP follow up     SUBJECTIVE:                                                    Daisy Sun is a 41 year old year old male who presents to clinic today for the following health issues:    BP follow up:  -BP at home is 140-50s if not taking meds  -BP is 120s systolic if he takes his medications  -Not sure what medications he has been taking  -Not sure if he has follow up scheduled with nephrology yet.   -BP occasionally > 160      Patient is an  established patient of this clinic.    A [employer]  was used for this visit  ----------------------------------------------------------------------------------------------------------------------  Patient Active Problem List   Diagnosis     Essential hypertension, malignant     Anoxic brain damage (H)     Health Care Home     Generalized convulsive epilepsy (H)     Intracranial hemorrhage (H)     Major depressive disorder, recurrent episode, moderate (H)     Hemiparesis affecting right side as late effect of cerebrovascular accident (H)     Tobacco dependence syndrome     Alcohol abuse     Adiposity     Elevated hemoglobin (H)     Past Surgical History:   Procedure Laterality Date     NO HISTORY OF SURGERY         Social History     Tobacco Use     Smoking status: Current Every Day Smoker     Types: Cigarettes     Smokeless tobacco: Never Used     Tobacco comment: 6-7 per day.    Substance Use Topics     Alcohol use: Yes     Alcohol/week: 0.0 standard drinks     Comment: 6-7 beers daily.      History reviewed. No pertinent family history.      Problem list and past medical, surgical, social, and family histories reviewed & adjusted, as indicated.    Current Outpatient Medications   Medication Sig Dispense Refill     amLODIPine (NORVASC) 10 MG tablet Take 1 tablet (10 mg) by mouth daily 90 tablet 3     citalopram (CELEXA) 40 MG tablet Take 1 tablet (40 mg) by mouth daily 90 tablet 3     ketoconazole (NIZORAL) 2 % external cream Apply topically 2 times daily Apply to left arm, neck and chest twice a day 60 g 1     levETIRAcetam (KEPPRA) 1000 MG tablet Take 1 tablet (1,000 mg) by mouth 2 times daily 180 tablet 3     losartan (COZAAR) 100 MG tablet Take 1 tablet (100 mg) by mouth daily 90 tablet 3     metoprolol tartrate (LOPRESSOR) 100 MG tablet Take 1 tablet (100 mg) by mouth 2 times daily 180 tablet 3     order for DME Equipment being ordered:     Redo right leg AFO    [employer] to assess 1 Device 0      order for DME Equipment being ordered: New JAVAD brace 1 Units 0     sodium bicarbonate 650 MG tablet Take 1 tablet (650 mg) by mouth 2 times daily 180 tablet 3     Medication list reviewed and updated as indicated.    No Known Allergies  Allergies reviewed and updated as indicated.  ----------------------------------------------------------------------------------------------------------------------  ROS:     ROS: 10 point ROS neg other than the symptoms noted above in the HPI.    OBJECTIVE:     /86   Temp 98.6  F (37  C) (Oral)   Resp 20   Wt 88.5 kg (195 lb)   SpO2 98%   BMI 31.47 kg/m    Body mass index is 31.47 kg/m .  GENERAL: Appears well and in no acute distress.  CARDIOVASCULAR: Regular rate and rhythm, normal S1 and S2 without murmur. No extra heartsounds or friction rub.   RESPIRATORY: Lungs clear to auscultation bilaterally. No wheezing or crackles. No prolonged expiration. Symmetrical chest rise.  MSK: No gross extremity deformities. No peripheral edema. Normal muscle bulk.    Diagnostic Test Results:  none

## 2022-06-16 LAB
ANA SER QL IF: NEGATIVE
HBV SURFACE AB SERPLBLD QL IA.RAPID: POSITIVE
HBV SURFACE AG SERPL QL IA: NONREACTIVE
HCV AB SERPL QL IA: NEGATIVE

## 2022-06-17 LAB
ALBUMIN PERCENT: 59.3 % (ref 51–67)
ALBUMIN SERPL ELPH-MCNC: 4.6 G/DL (ref 3.2–4.7)
ALPHA 1 PERCENT: 2.5 % (ref 2–4)
ALPHA 2 PERCENT: 8.6 % (ref 5–13)
ALPHA1 GLOB SERPL ELPH-MCNC: 0.2 G/DL (ref 0.1–0.3)
ALPHA2 GLOB SERPL ELPH-MCNC: 0.7 G/DL (ref 0.4–0.9)
B-GLOBULIN SERPL ELPH-MCNC: 1 G/DL (ref 0.7–1.2)
BETA PERCENT: 13.6 % (ref 10–17)
GAMMA GLOB SERPL ELPH-MCNC: 1.2 G/DL (ref 0.6–1.4)
GAMMA GLOBULIN PERCENT: 16 % (ref 9–20)
HBV CORE AB SERPL QL IA: POSITIVE
PATH ICD:: NORMAL
PATH ICD:: NORMAL
PROT PATTERN SERPL ELPH-IMP: NORMAL
PROT PATTERN UR ELPH-IMP: NORMAL
REVIEWING PATHOLOGIST: NORMAL
REVIEWING PATHOLOGIST: NORMAL
TOTAL PROTEIN RANDOM URINE MG/DL: 259 MG/DL
TOTAL PROTEIN SERUM FOR ELP (SYNCED VALUE): 7.7 G/DL
TOTAL PROTEIN SERUM FOR ELP: 7.7 G/DL (ref 6–8)

## 2022-06-19 DIAGNOSIS — R79.89 ELEVATED LFTS: Primary | ICD-10-CM

## 2023-01-04 NOTE — NURSING NOTE
Due to patient being non-English speaking/uses sign language, an  was used for this visit. Only for face-to-face interpretation by an external agency, date and length of interpretation can be found on the scanned worksheet.     name: Joaquin Valle  Agency: [employer]  Language: Katelynong   Telephone number: [phone]  Type of interpretation: Face-to-face, spoken       yes...

## 2023-01-16 DIAGNOSIS — I10 ESSENTIAL HYPERTENSION, MALIGNANT: ICD-10-CM

## 2023-01-16 RX ORDER — LOSARTAN POTASSIUM 100 MG/1
100 TABLET ORAL DAILY
Qty: 90 TABLET | Refills: 3 | Status: SHIPPED | OUTPATIENT
Start: 2023-01-16 | End: 2024-03-12 | Stop reason: DRUGHIGH

## 2023-01-23 DIAGNOSIS — G40.909 SEIZURE DISORDER (H): ICD-10-CM

## 2023-01-23 DIAGNOSIS — F33.1 MAJOR DEPRESSIVE DISORDER, RECURRENT EPISODE, MODERATE (H): ICD-10-CM

## 2023-01-23 DIAGNOSIS — I10 ESSENTIAL HYPERTENSION, MALIGNANT: ICD-10-CM

## 2023-01-23 RX ORDER — LEVETIRACETAM 1000 MG/1
1000 TABLET ORAL 2 TIMES DAILY
Qty: 180 TABLET | Refills: 3 | Status: SHIPPED | OUTPATIENT
Start: 2023-01-23 | End: 2024-03-12

## 2023-01-23 RX ORDER — AMLODIPINE BESYLATE 10 MG/1
10 TABLET ORAL DAILY
Qty: 90 TABLET | Refills: 3 | Status: SHIPPED | OUTPATIENT
Start: 2023-01-23 | End: 2024-03-12

## 2023-01-23 RX ORDER — CITALOPRAM HYDROBROMIDE 40 MG/1
40 TABLET ORAL DAILY
Qty: 90 TABLET | Refills: 3 | Status: SHIPPED | OUTPATIENT
Start: 2023-01-23 | End: 2024-03-12

## 2023-05-01 DIAGNOSIS — I10 ESSENTIAL HYPERTENSION, MALIGNANT: ICD-10-CM

## 2023-05-01 RX ORDER — METOPROLOL TARTRATE 100 MG
100 TABLET ORAL 2 TIMES DAILY
Qty: 180 TABLET | Refills: 3 | Status: SHIPPED | OUTPATIENT
Start: 2023-05-01 | End: 2024-03-12

## 2023-06-12 PROBLEM — R31.29 OTHER MICROSCOPIC HEMATURIA: Status: ACTIVE | Noted: 2023-06-12

## 2023-06-12 PROBLEM — D58.2 ELEVATED HEMOGLOBIN (H): Status: RESOLVED | Noted: 2022-04-18 | Resolved: 2023-06-12

## 2023-06-12 PROBLEM — K76.0 FATTY LIVER: Status: ACTIVE | Noted: 2023-06-12

## 2023-09-06 DIAGNOSIS — N18.32 STAGE 3B CHRONIC KIDNEY DISEASE (H): ICD-10-CM

## 2023-09-06 RX ORDER — SODIUM BICARBONATE 650 MG/1
650 TABLET ORAL 2 TIMES DAILY
Qty: 180 TABLET | OUTPATIENT
Start: 2023-09-06

## 2023-09-06 NOTE — TELEPHONE ENCOUNTER
I have denied refilling this medication.  Please contact pharmacy and patient and inform.  If wanting this medication will need an appointment to discuss.    Patient due for yearly check with me and with labwork.  I will refill for a bridge until his appointment so let me know when he's scheduled.

## 2023-09-07 NOTE — TELEPHONE ENCOUNTER
I spoke w/pharmacy and told him you denied the refill til pt comes in. Also called [facility] olvera @ 1:46pm advising your remarks and to call clinic to schedule w/phone#.

## 2023-10-16 RX ORDER — SODIUM BICARBONATE 650 MG/1
650 TABLET ORAL 4 TIMES DAILY
OUTPATIENT
Start: 2023-10-16

## 2023-12-11 DIAGNOSIS — N18.32 STAGE 3B CHRONIC KIDNEY DISEASE (H): ICD-10-CM

## 2023-12-11 RX ORDER — SODIUM BICARBONATE 650 MG/1
650 TABLET ORAL 2 TIMES DAILY
Qty: 180 TABLET | Refills: 3 | OUTPATIENT
Start: 2023-12-11

## 2023-12-11 NOTE — TELEPHONE ENCOUNTER
Patient has no showed but very important for appt. Can see any provider here to check liver, kidneys and ensure bp is controlled.    Appt with anyone needed, even a lab visit with BP check with nurse would be helpful.

## 2023-12-11 NOTE — TELEPHONE ENCOUNTER
Message to physician:     Date of last visit: 6/15/2022    Date of next visit if scheduled:     Potassium   Date Value Ref Range Status   06/15/2022 4.9 3.5 - 5.0 mmol/L Final   01/05/2021 4.6 3.4 - 5.3 mmol/L Final     Creatinine   Date Value Ref Range Status   06/15/2022 1.25 0.70 - 1.30 mg/dL Final   01/05/2021 1.8 (H) 0.8 - 1.5 mg/dL Final     GFR Estimate   Date Value Ref Range Status   06/15/2022 73 >60 mL/min/1.73m2 Final     Comment:     Effective December 21, 2021 eGFRcr in adults is calculated using the 2021 CKD-EPI creatinine equation which includes age and gender (Ruth et al., NEJM, DOI: 10.1056/UXQOqw0970771)   12/07/2020 45 (L) >60 mL/min/1.73m2 Final     GFR, ESTIMATED POCT   Date Value Ref Range Status   06/03/2022 30 (L) >60 mL/min/1.73m2 Final       BP Readings from Last 3 Encounters:   06/15/22 124/86   04/18/22 (!) 149/112   01/07/21 122/87       Hemoglobin A1C   Date Value Ref Range Status   08/17/2016 5.3 4.1 - 5.7 % Final       Please complete refill and CLOSE ENCOUNTER.  Closing the encounter signifies the refill is complete.

## 2024-02-16 DIAGNOSIS — I10 ESSENTIAL HYPERTENSION, MALIGNANT: ICD-10-CM

## 2024-02-16 DIAGNOSIS — F33.1 MAJOR DEPRESSIVE DISORDER, RECURRENT EPISODE, MODERATE (H): ICD-10-CM

## 2024-02-16 DIAGNOSIS — N18.32 STAGE 3B CHRONIC KIDNEY DISEASE (H): ICD-10-CM

## 2024-02-16 DIAGNOSIS — G40.909 SEIZURE DISORDER (H): ICD-10-CM

## 2024-02-17 NOTE — TELEPHONE ENCOUNTER
Very important for patient to come to clinic for bp check, kidney blood work, seizure medicine blood work.  Please schedule with any provider.  Patricia Mason MD

## 2024-02-17 NOTE — TELEPHONE ENCOUNTER
Message to physician: pharmacy refill    Date of last visit: 6/15/2022    Date of next visit if scheduled: none    Potassium   Date Value Ref Range Status   06/15/2022 4.9 3.5 - 5.0 mmol/L Final   01/05/2021 4.6 3.4 - 5.3 mmol/L Final     Creatinine   Date Value Ref Range Status   06/15/2022 1.25 0.70 - 1.30 mg/dL Final   01/05/2021 1.8 (H) 0.8 - 1.5 mg/dL Final     GFR Estimate   Date Value Ref Range Status   06/15/2022 73 >60 mL/min/1.73m2 Final     Comment:     Effective December 21, 2021 eGFRcr in adults is calculated using the 2021 CKD-EPI creatinine equation which includes age and gender (Ruth et al., NEJM, DOI: 10.1056/KVWYhj4718845)   12/07/2020 45 (L) >60 mL/min/1.73m2 Final     GFR, ESTIMATED POCT   Date Value Ref Range Status   06/03/2022 30 (L) >60 mL/min/1.73m2 Final       BP Readings from Last 3 Encounters:   06/15/22 124/86   04/18/22 (!) 149/112   01/07/21 122/87       Hemoglobin A1C   Date Value Ref Range Status   08/17/2016 5.3 4.1 - 5.7 % Final       Please complete refill and CLOSE ENCOUNTER.  Closing the encounter signifies the refill is complete.

## 2024-02-23 ENCOUNTER — OFFICE VISIT (OUTPATIENT)
Dept: FAMILY MEDICINE | Facility: CLINIC | Age: 44
End: 2024-02-23
Payer: MEDICARE

## 2024-02-23 VITALS
OXYGEN SATURATION: 99 % | WEIGHT: 197 LBS | RESPIRATION RATE: 16 BRPM | DIASTOLIC BLOOD PRESSURE: 85 MMHG | SYSTOLIC BLOOD PRESSURE: 121 MMHG | BODY MASS INDEX: 31.8 KG/M2 | TEMPERATURE: 97.9 F

## 2024-02-23 DIAGNOSIS — R80.9 PROTEINURIA, UNSPECIFIED TYPE: ICD-10-CM

## 2024-02-23 DIAGNOSIS — I10 ESSENTIAL HYPERTENSION, MALIGNANT: Primary | ICD-10-CM

## 2024-02-23 DIAGNOSIS — N18.32 STAGE 3B CHRONIC KIDNEY DISEASE (H): ICD-10-CM

## 2024-02-23 DIAGNOSIS — R34 ANURIA: ICD-10-CM

## 2024-02-23 LAB
ALBUMIN SERPL-MCNC: 3 G/DL (ref 3.4–5)
ALP SERPL-CCNC: 109 U/L (ref 40–150)
ALT SERPL W P-5'-P-CCNC: 21 U/L (ref 0–70)
ANION GAP SERPL CALCULATED.3IONS-SCNC: 12 MMOL/L (ref 3–14)
AST SERPL W P-5'-P-CCNC: 24 U/L (ref 0–45)
BILIRUB SERPL-MCNC: 0.6 MG/DL (ref 0.2–1.3)
BUN SERPL-MCNC: 46 MG/DL (ref 7–30)
CALCIUM SERPL-MCNC: 8 MG/DL (ref 8.5–10.1)
CHLORIDE BLD-SCNC: 112 MMOL/L (ref 94–109)
CO2 SERPL-SCNC: 14 MMOL/L (ref 20–32)
CREAT SERPL-MCNC: 4.2 MG/DL (ref 0.66–1.25)
EGFRCR SERPLBLD CKD-EPI 2021: 17 ML/MIN/1.73M2
GLUCOSE BLD-MCNC: 96 MG/DL (ref 70–99)
POTASSIUM BLD-SCNC: 4.3 MMOL/L (ref 3.4–5.3)
PROT SERPL-MCNC: 7.5 G/DL (ref 6.8–8.8)
SODIUM SERPL-SCNC: 138 MMOL/L (ref 135–145)

## 2024-02-23 PROCEDURE — 80053 COMPREHEN METABOLIC PANEL: CPT

## 2024-02-23 PROCEDURE — 99215 OFFICE O/P EST HI 40 MIN: CPT | Mod: GC

## 2024-02-23 PROCEDURE — 36415 COLL VENOUS BLD VENIPUNCTURE: CPT

## 2024-02-23 NOTE — PROGRESS NOTES
Assessment & Plan   Daisy Sun is a 43 year old male with PMH notable for poorly controlled hypertension, stage 3b CKD, anoxic brain injury, CVA with resulting hemiparesis, alcohol abuse. Presenting for blood pressure check, lab work, medication refill. Has not been seen since 06/2022 at which time blood pressure was elevated and nephrotic range proteinuria was noted and worked up. Presenting now with significantly worsening kidney function, acuteness unclear given patient has not been seen for years. Recommending presentation to the emergency department for evaluation and possible initiation of dialysis.      Elevated Creatinine   History Stage 3b CKD   History of Nephrotic Range Proteinuria   Anuria   Has been seen inconsistently in our clinic since 2021 when concerning kidney function was first noted. Felt to be possible secondary to hypertension, known renal mass (06/2022 called out as benign). SPEP, UPEP both normal ruling out MM, HIV, Hep B, Hep C as underlying etiologies. Most recent BMP was in 06/2022 but did show improvement. Nephrology consult was placed previously, does not appear patient was ever seen by this team. BMP today with a creatinine of 4.2 -- recommending urgent presentation to the emergency department for further work up and possible initiation of dialysis.    - Repeat CMP today - creatinine 4.2   - Transfer to ED via EMS (patient is reportedly not his own decision maker, brother is not in town, nephew does not feel comfortable transporting in private car)    Hypertension   Originally scheduled for hypertension follow up and medication refill. Due to acute nature of above concern, was not able to address.    - Recommend RTC following ED/hospital discharge     Simon Villa is a 43 year old, presenting for the following health issues:  Recheck Medication (Follow up medication/)    HPI   - Peeing once every three days    - reports dark yellow urine    - eating and drinking ok   - patient  "refusing to go    - \"no problem\" when discussing risks including death    - states he does not care if he dies    - forgot his own name and nephews name, unclear of baseline but certainly concern for altered mental status   - Ultimately agreeable to go after speaking with brother, will be transferred by EMS     ROS negative aside from those concerns noted in the HPI and A+P above.         Objective    /85   Temp 97.9  F (36.6  C) (Oral)   Resp 16   Wt 89.4 kg (197 lb)   SpO2 99%   BMI 31.80 kg/m    Body mass index is 31.8 kg/m .  Physical Exam   General: Flat affect. Does not appear in acute distress.  present. Nephew at beside, brother on phone.   Respiratory: No respiratory distress.   Cardiac: RRR.   Extremities: UE and LE grossly normal.   Neurological: When waling out of room, walking out with significant right limp/right sided deficit - probably baseline.   Psychiatric: Poor insight. Making passive SI comments.     Signed Electronically by: Tyesha Rodriguez MD    "

## 2024-02-24 ENCOUNTER — MEDICAL CORRESPONDENCE (OUTPATIENT)
Dept: HEALTH INFORMATION MANAGEMENT | Facility: CLINIC | Age: 44
End: 2024-02-24

## 2024-02-29 PROBLEM — N18.31 CHRONIC KIDNEY DISEASE, STAGE 3A (H): Status: ACTIVE | Noted: 2024-02-29

## 2024-03-01 ENCOUNTER — PATIENT OUTREACH (OUTPATIENT)
Dept: CARE COORDINATION | Facility: CLINIC | Age: 44
End: 2024-03-01
Payer: MEDICARE

## 2024-03-01 RX ORDER — AMLODIPINE BESYLATE 10 MG/1
10 TABLET ORAL DAILY
Qty: 90 TABLET | OUTPATIENT
Start: 2024-03-01

## 2024-03-01 RX ORDER — CITALOPRAM HYDROBROMIDE 40 MG/1
40 TABLET ORAL DAILY
Qty: 90 TABLET | OUTPATIENT
Start: 2024-03-01

## 2024-03-01 RX ORDER — SODIUM BICARBONATE 650 MG/1
650 TABLET ORAL 2 TIMES DAILY
Qty: 180 TABLET | OUTPATIENT
Start: 2024-03-01

## 2024-03-01 RX ORDER — LEVETIRACETAM 1000 MG/1
1000 TABLET ORAL 2 TIMES DAILY
Qty: 180 TABLET | OUTPATIENT
Start: 2024-03-01

## 2024-03-01 ASSESSMENT — ACTIVITIES OF DAILY LIVING (ADL): DEPENDENT_IADLS:: INDEPENDENT

## 2024-03-01 NOTE — PROGRESS NOTES
Preceptor Attestation:  Patient's case reviewed and discussed with the resident, Tyesha Rodriguez MD, and I personally evaluated the patient. I agree with written assessment and plan of care.    Supervising Physician:  Farida Cooper MD   [facility]

## 2024-03-01 NOTE — PROGRESS NOTES
Clinic Care Coordination Contact  [facility]: Post-Discharge Note  SITUATION                                                      Admission:    Admission Date: 02/23/24   Reason for Admission: Acute renal failure superimposed on chronic kidney disease, unspecified acute renal failure type, unspecified CKD stage (HRC) (Primary Dx); Anemia, unspecified type; Alcohol abuse; Metabolic acidosis; Creatinine elevation; Acute renal failur...  Discharge:   Discharge Date: 02/29/24  Discharge Diagnosis: Acute renal failure superimposed on chronic kidney disease, unspecified acute renal failure type, unspecified CKD stage (HRC) (Primary Dx); Anemia, unspecified type; Alcohol abuse; Metabolic acidosis; Creatinine elevation; Acute renal failur...    BACKGROUND                                                      Per hospital discharge summary and inpatient provider notes:      ASSESSMENT           Discharge Assessment  How are you doing now that you are home?: Pt is doing better  How are your symptoms? (Red Flag symptoms escalate to triage hotline per guidelines): Improved  Do you feel your condition is stable enough to be safe at home until your provider visit?: Yes  Does the patient have their discharge instructions? : Yes  Does the patient have questions regarding their discharge instructions? : No  Were you started on any new medications or were there changes to any of your previous medications? : Yes  Does the patient have all of their medications?: Yes  Do you have questions regarding any of your medications? : No  Do you have all of your needed medical supplies or equipment (DME)?  (i.e. oxygen tank, CPAP, cane, etc.): Yes  Discharge follow-up appointment scheduled within 14 calendar days? : Yes  Discharge Follow Up Appointment Date: 03/05/24  Discharge Follow Up Appointment Scheduled with?: Primary Care Provider                  PLAN                                                      Outpatient Plan:      Future  Appointments   Date Time Provider Department Center   3/5/2024  2:00 PM Budd, Jennifer, DO [facility]         For any urgent concerns, please contact our 24 hour nurse triage line: [phone]       KELLEY Rueda

## 2024-03-05 ENCOUNTER — TELEPHONE (OUTPATIENT)
Dept: FAMILY MEDICINE | Facility: CLINIC | Age: 44
End: 2024-03-05

## 2024-03-05 NOTE — TELEPHONE ENCOUNTER
Sofiya is calling from [facility] x 8 weeks then occupational/physical therapy evaluate and treat. Speech therapy evaluate and treat. Social Work for what he qualities for. She is requesting a start of care to be sent as soon as possible for patient. Fax number is [phone]. Thank you.  Kaira Ross on 3/5/2024 at 4:32 PM

## 2024-03-06 NOTE — TELEPHONE ENCOUNTER
Writer called [facility] and left verbal order on VM after [facility] care transferred me. Left verbal orders for start of care, requested call back with questions. Caro MCLEAN

## 2024-03-08 ENCOUNTER — TELEPHONE (OUTPATIENT)
Dept: FAMILY MEDICINE | Facility: CLINIC | Age: 44
End: 2024-03-08
Payer: MEDICARE

## 2024-03-08 NOTE — TELEPHONE ENCOUNTER
Tyesha Casey called stating that she saw the patient today for eval --no visits needed -- at baseline-- alsothat his Diastolic BP was high-- 142/102-- which she was told is normal for him.  HARVINDER

## 2024-03-12 ENCOUNTER — DOCUMENTATION ONLY (OUTPATIENT)
Dept: FAMILY MEDICINE | Facility: CLINIC | Age: 44
End: 2024-03-12

## 2024-03-12 ENCOUNTER — OFFICE VISIT (OUTPATIENT)
Dept: FAMILY MEDICINE | Facility: CLINIC | Age: 44
End: 2024-03-12
Payer: MEDICARE

## 2024-03-12 VITALS
TEMPERATURE: 98 F | WEIGHT: 190 LBS | SYSTOLIC BLOOD PRESSURE: 121 MMHG | DIASTOLIC BLOOD PRESSURE: 86 MMHG | BODY MASS INDEX: 30.67 KG/M2 | HEART RATE: 69 BPM | OXYGEN SATURATION: 100 %

## 2024-03-12 DIAGNOSIS — Z09 HOSPITAL DISCHARGE FOLLOW-UP: Primary | ICD-10-CM

## 2024-03-12 DIAGNOSIS — N02.B9 IGA NEPHROPATHY: ICD-10-CM

## 2024-03-12 DIAGNOSIS — I10 ESSENTIAL HYPERTENSION: ICD-10-CM

## 2024-03-12 DIAGNOSIS — F10.20 ALCOHOL DEPENDENCE, CONTINUOUS (H): ICD-10-CM

## 2024-03-12 DIAGNOSIS — F17.200 TOBACCO USE DISORDER: ICD-10-CM

## 2024-03-12 DIAGNOSIS — F33.1 MAJOR DEPRESSIVE DISORDER, RECURRENT EPISODE, MODERATE (H): ICD-10-CM

## 2024-03-12 DIAGNOSIS — D63.8 ANEMIA, CHRONIC DISEASE: ICD-10-CM

## 2024-03-12 DIAGNOSIS — N18.4 CKD (CHRONIC KIDNEY DISEASE) STAGE 4, GFR 15-29 ML/MIN (H): ICD-10-CM

## 2024-03-12 DIAGNOSIS — Z13.220 SCREENING CHOLESTEROL LEVEL: ICD-10-CM

## 2024-03-12 DIAGNOSIS — G40.909 SEIZURE DISORDER (H): ICD-10-CM

## 2024-03-12 PROBLEM — N25.81 SECONDARY RENAL HYPERPARATHYROIDISM (H): Status: ACTIVE | Noted: 2024-02-24

## 2024-03-12 PROBLEM — R31.9 HEMATURIA: Status: ACTIVE | Noted: 2024-02-24

## 2024-03-12 PROBLEM — R80.9 PROTEINURIA: Status: ACTIVE | Noted: 2024-02-24

## 2024-03-12 LAB
ERYTHROCYTE [DISTWIDTH] IN BLOOD BY AUTOMATED COUNT: 13.9 % (ref 10–15)
HCT VFR BLD AUTO: 27.3 % (ref 40–53)
HGB BLD-MCNC: 8.2 G/DL (ref 13.3–17.7)
MCH RBC QN AUTO: 29.4 PG (ref 26.5–33)
MCHC RBC AUTO-ENTMCNC: 30 G/DL (ref 31.5–36.5)
MCV RBC AUTO: 98 FL (ref 78–100)
PLATELET # BLD AUTO: 224 10E3/UL (ref 150–450)
RBC # BLD AUTO: 2.79 10E6/UL (ref 4.4–5.9)
WBC # BLD AUTO: 7.8 10E3/UL (ref 4–11)

## 2024-03-12 PROCEDURE — 85027 COMPLETE CBC AUTOMATED: CPT

## 2024-03-12 PROCEDURE — 36415 COLL VENOUS BLD VENIPUNCTURE: CPT

## 2024-03-12 PROCEDURE — 80061 LIPID PANEL: CPT

## 2024-03-12 PROCEDURE — 99495 TRANSJ CARE MGMT MOD F2F 14D: CPT | Mod: GC

## 2024-03-12 PROCEDURE — 80048 BASIC METABOLIC PNL TOTAL CA: CPT

## 2024-03-12 RX ORDER — LEVETIRACETAM 1000 MG/1
1000 TABLET ORAL 2 TIMES DAILY
Qty: 180 TABLET | Refills: 3 | Status: SHIPPED | OUTPATIENT
Start: 2024-03-12

## 2024-03-12 RX ORDER — LOSARTAN POTASSIUM 25 MG/1
25 TABLET ORAL DAILY
Qty: 90 TABLET | Refills: 0 | Status: SHIPPED | OUTPATIENT
Start: 2024-03-12 | End: 2024-03-28

## 2024-03-12 RX ORDER — CITALOPRAM HYDROBROMIDE 40 MG/1
40 TABLET ORAL DAILY
Qty: 90 TABLET | Refills: 3 | Status: SHIPPED | OUTPATIENT
Start: 2024-03-12

## 2024-03-12 RX ORDER — METOPROLOL TARTRATE 100 MG
100 TABLET ORAL 2 TIMES DAILY
Qty: 180 TABLET | Refills: 3 | Status: SHIPPED | OUTPATIENT
Start: 2024-03-12

## 2024-03-12 RX ORDER — AMLODIPINE BESYLATE 5 MG/1
5 TABLET ORAL DAILY
Qty: 90 TABLET | Refills: 3 | Status: SHIPPED | OUTPATIENT
Start: 2024-03-12

## 2024-03-12 RX ORDER — SODIUM BICARBONATE 650 MG/1
650 TABLET ORAL 2 TIMES DAILY
Qty: 180 TABLET | Refills: 3 | Status: SHIPPED | OUTPATIENT
Start: 2024-03-12 | End: 2024-03-13

## 2024-03-12 RX ORDER — AMLODIPINE BESYLATE 5 MG/1
5 TABLET ORAL DAILY
COMMUNITY
Start: 2024-02-29 | End: 2024-03-12

## 2024-03-12 ASSESSMENT — PATIENT HEALTH QUESTIONNAIRE - PHQ9
SUM OF ALL RESPONSES TO PHQ QUESTIONS 1-9: 0
SUM OF ALL RESPONSES TO PHQ QUESTIONS 1-9: 0
10. IF YOU CHECKED OFF ANY PROBLEMS, HOW DIFFICULT HAVE THESE PROBLEMS MADE IT FOR YOU TO DO YOUR WORK, TAKE CARE OF THINGS AT HOME, OR GET ALONG WITH OTHER PEOPLE: NOT DIFFICULT AT ALL

## 2024-03-12 NOTE — PROGRESS NOTES
Forms Request:  Today's Date: March 12, 2024   Form Type: Home Care Orders, 3/8/24 - Speech Therapy Evaluation performed.   Where is the form from: [facility]  How was form received: Fax  JASWANT on file:  n/a  How is form being returned: Fax  Fax Number: [phone]  PCP: Patricia Mason   Color Team: Blue Team  Form Given to: Call Center

## 2024-03-12 NOTE — PATIENT INSTRUCTIONS
Make sure you see nephrology on 4/10/24 at 3PM at [facility] with Dr. Romero.   RESTART losartan at 25 mg daily. We have refilled all your other meds.   We would like to see you back in 2 weeks!

## 2024-03-12 NOTE — PROGRESS NOTES
Preceptor Attestation:  Patient's case reviewed and discussed with the resident, Kurt Son MD, and I personally evaluated the patient. I agree with written assessment and plan of care.    Supervising Physician:  Farida Cooper MD   [facility]

## 2024-03-12 NOTE — PROGRESS NOTES
"Resident/Fellow Attestation   I, Kurt Son MD, was present with the medical/RODNEY student who participated in the service and in the documentation of the note.  I have verified the history and personally performed the physical exam and medical decision making.  I agree with the assessment and plan of care as documented in the note.      HFU. Was at [facility] 2/23-2/29 for Cr >4 with anuria, found to have IgA nephropathy. Nephrology followed, no indication for dialysis while admitted. Has not followed up with them in outpatient yet, scheduled for 4/10/24.   Is overall feeling better. Working with home OT/RN/SLP, scheduled for home PT in several days.   HTN: losartan was held during admission for kidney function and amlodipine dose reduced. Is above goal (130/80) today, will add back on low dose losartan. Will see back in 2 weeks. BMP obtained.   Anemia: Hgb 7.9 prior to discharge, thought to be d/t chronic disease. Supposed to be starting Retacrit qweek. CBC obtained.   MDD: On Celexa 40 mg daily. Thinks it has helped his mental health but still experiencing symptoms. No SI. Declines therapy or med adjustment. Will re-evaluate at follow up, refill provided.  Tobacco use: Still smoking several cigarettes daily. Has smoked ~30 years, previously 1ppd. Endorses depression being barrier to complete cessation. Not interested in treatment options today. Will re-evaluate at follow up.   EtOH use: Drinking 4-5 beers daily. \"Helps me sleep.\" History of withdrawal symptoms. Appears mildly jaundice on exam today, no obvious ascites on abdominal exam. LFTs ok at hospital discharge. Not interested in treatment options today. Will re-evaluate at follow up.     Kurt Son MD  PGY2  Date of Service (when I saw the patient): 03/12/24    Assessment & Plan     Daisy is a 43 year old male with a PMH of recently diagnosed IgA nephropathy and CKD (diagnosed at 2/23 hospital admission), prior CVA with right-sided hemiparesis, HTN, and " alcohol use disorder who presents for hospital follow up after 2/23 admission for incidentally found creatinine of >4.    #IgA nephropathy  #CKD  #Anemia  Daisy reports improved urination, energy, appetite, and sleep since hospitalization. Nephrology appointment on 4/10/24 at 3 PM.   -Labs obtained today: CBC, BMP, lipids  -Restart losartan today at 25 mg dose; nephrology can adjust from here  -Epo will be administered by nephrology  -Continue sodium bicarb    #Alcohol use disorder  Daisy reports drinking 4-5 beers per day and states it helps him fall asleep. He is not interested in discussing cutting back on his alcohol use at this time or medications to help decrease use.   -Continue to discuss and future appointments    #HTN  BP slightly elevated in clinic today at 121/86.   -Continue amlodipine 5 mg, metoprolol  -Restart Losartan at 25 mg dose, nephrology to dose from here  -Follow up in clinic in 2 weeks regarding HTN    #Prior CVA and right-sided hemiparesis  Hx of stroke over 10 years ago with right sided hemiparesis and dysarthria.   -Continue renally dosed PTA Keppra    #Depression  Pt reports improvement in symptoms with Celexa, but states he will has symptoms and uses tobacco and alcohol to cope. Is not interested in therapy or increasing Celexa dose at this time.  -Continue Celexa, discuss dose adjustment at next visit    MED REC REQUIRED  Post Medication Reconciliation Status:  Discharge medications reconciled and changed, see notes/orders    Return in about 2 weeks (around 3/26/2024) for Follow up.    Subjective   Daisy is a 43 year old, presenting for the following health issues:  Hospital F/U, Refill Request, and Referral (New leg brace.)        3/12/2024     1:52 PM   Additional Questions   Roomed by Aldair         3/12/2024    Information    services provided? Yes   Language Hmong   Type of interpretation provided Face-to-face    name Vickey Hansen    Ag[employer]  M[place] Villa is here with his nephew today. He reports doing well since discharge. He states his energy is improved, appetite improved, sleep improved, and he is peeing more frequently than before. He has been receiving the following home care services: OT, nursing, speech therapy, and PT (to start in a couple of days). He has not seen nephrology since discharge, but is scheduled for an appointment on 4/10. He reports drinking 4-5 beers per day to help him sleep and is not interested in discussing cutting back. He states he has never had withdrawal seizures in the past. He smokes 2-3 cigarettes per day, and has been smoking since age 15 (about 28 years) but has cut back from 1 ppd. Regarding his mental health, he states the Celexa is helping a bit but he still has symptoms and thus uses tobacco and alcohol to help.         3/1/2024    10:56 AM   Post Discharge Outreach   Admission Date 2/23/2024   Reason for Admission Acute renal failure superimposed on chronic kidney disease, unspecified acute renal failure type, unspecified CKD stage (HRC) (Primary Dx); Anemia, unspecified type; Alcohol abuse; Metabolic acidosis; Creatinine elevation; Acute renal failur...   Discharge Date 2/29/2024   Discharge Diagnosis Acute renal failure superimposed on chronic kidney disease, unspecified acute renal failure type, unspecified CKD stage (HRC) (Primary Dx); Anemia, unspecified type; Alcohol abuse; Metabolic acidosis; Creatinine elevation; Acute renal failur...   How are you doing now that you are home? Pt is doing better   How are your symptoms? (Red Flag symptoms escalate to triage hotline per guidelines) Improved   Do you feel your condition is stable enough to be safe at home until your provider visit? Yes   Does the patient have their discharge instructions?  Yes   Does the patient have questions regarding their discharge instructions?  No   Were you started on any new medications or were there changes to any of your  previous medications?  Yes   Does the patient have all of their medications? Yes   Do you have questions regarding any of your medications?  No   Do you have all of your needed medical supplies or equipment (DME)?  (i.e. oxygen tank, CPAP, cane, etc.) Yes   Discharge follow-up appointment scheduled within 14 calendar days?  Yes   Discharge Follow Up Appointment Date 3/5/2024   Discharge Follow Up Appointment Scheduled with? Primary Care Provider     Hospital Follow-up Visit:    Hospital/Nursing Home/IP Rehab Facility:  home cares  Date of Admission: 2/23/24  Date of Discharge: 2/29/24  Reason(s) for Admission: acute renal failure vs progression of chronic kidney disease with creatinine >4    Was your hospitalization related to COVID-19? No   Problems taking medications regularly:  None  Medication changes since discharge: discontinued losartan after discharge, will restart today at 25 mg  Problems adhering to non-medication therapy:  None    Summary of hospitalization:  [facility] discharge summary reviewed  Diagnostic Tests/Treatments reviewed.  Follow up needed: follow up in 2 weeks with PCP regarding HTN, follow up on 4/10/24 with nephrology  Other Healthcare Providers Involved in Patient s Care:         Homecare and Specialist appointment - nephrology clinic 4/10/24 at 3 PM  Update since discharge: improved.       Plan of care communicated with patient and family           Review of Systems  CONSTITUTIONAL: NEGATIVE for fever, chills, change in weight  ENT/MOUTH: NEGATIVE for ear, mouth and throat problems  RESP: NEGATIVE for significant cough or SOB  CV: NEGATIVE for chest pain, palpitations or peripheral edema      Objective    /86   Pulse 69   Temp 98  F (36.7  C)   Wt 86.2 kg (190 lb)   SpO2 100%   BMI 30.67 kg/m    Body mass index is 30.67 kg/m .  Physical Exam   GENERAL: alert and no distress, mild jaundice and scleral icterus noted, pt is slow to respond and responds in short  sentences  RESP: lungs clear to auscultation - no rales, rhonchi or wheezes  CV: regular rate and rhythm, normal S1 S2, no S3 or S4, no murmur, click or rub, no peripheral edema  ABDOMEN: soft, nontender, no hepatosplenomegaly, no masses and bowel sounds normal  MS: no gross musculoskeletal defects noted, no edema    Results for orders placed or performed in visit on 03/12/24 (from the past 24 hour(s))   CBC with platelets   Result Value Ref Range    WBC Count 7.8 4.0 - 11.0 10e3/uL    RBC Count 2.79 (L) 4.40 - 5.90 10e6/uL    Hemoglobin 8.2 (L) 13.3 - 17.7 g/dL    Hematocrit 27.3 (L) 40.0 - 53.0 %    MCV 98 78 - 100 fL    MCH 29.4 26.5 - 33.0 pg    MCHC 30.0 (L) 31.5 - 36.5 g/dL    RDW 13.9 10.0 - 15.0 %    Platelet Count 224 150 - 450 10e3/uL           This pt was seen and discussed with Dr. Son.  Anna Marie Colindres, MS3  [employer]    Signed Electronically by: Kurt Son MD    Answers submitted by the patient for this visit:  Patient Health Questionnaire (Submitted on 3/12/2024)  If you checked off any problems, how difficult have these problems made it for you to do your work, take care of things at home, or get along with other people?: Not difficult at all  PHQ9 TOTAL SCORE: 0

## 2024-03-13 ENCOUNTER — TELEPHONE (OUTPATIENT)
Dept: FAMILY MEDICINE | Facility: CLINIC | Age: 44
End: 2024-03-13
Payer: MEDICARE

## 2024-03-13 DIAGNOSIS — N18.4 CKD (CHRONIC KIDNEY DISEASE) STAGE 4, GFR 15-29 ML/MIN (H): ICD-10-CM

## 2024-03-13 LAB
ANION GAP SERPL CALCULATED.3IONS-SCNC: 8 MMOL/L (ref 7–15)
BUN SERPL-MCNC: 46.2 MG/DL (ref 6–20)
CALCIUM SERPL-MCNC: 7 MG/DL (ref 8.6–10)
CHLORIDE SERPL-SCNC: 118 MMOL/L (ref 98–107)
CHOLEST SERPL-MCNC: 99 MG/DL
CREAT SERPL-MCNC: 3.69 MG/DL (ref 0.67–1.17)
DEPRECATED HCO3 PLAS-SCNC: 9 MMOL/L (ref 22–29)
EGFRCR SERPLBLD CKD-EPI 2021: 20 ML/MIN/1.73M2
FASTING STATUS PATIENT QL REPORTED: ABNORMAL
GLUCOSE SERPL-MCNC: 92 MG/DL (ref 70–99)
HDLC SERPL-MCNC: 35 MG/DL
LDLC SERPL CALC-MCNC: 45 MG/DL
NONHDLC SERPL-MCNC: 64 MG/DL
POTASSIUM SERPL-SCNC: 6 MMOL/L (ref 3.4–5.3)
SODIUM SERPL-SCNC: 135 MMOL/L (ref 135–145)
TRIGL SERPL-MCNC: 93 MG/DL

## 2024-03-13 RX ORDER — SODIUM BICARBONATE 650 MG/1
650 TABLET ORAL 2 TIMES DAILY
Qty: 180 TABLET | Refills: 3 | Status: SHIPPED | OUTPATIENT
Start: 2024-03-13

## 2024-03-13 NOTE — TELEPHONE ENCOUNTER
Critical Result   Notified of critical lab result of BMP with CO2 of 9.0. Also noting creatinine of 3.69. Patient is a medically complex patient recently admitted at outside hospital for CKD and found to have likely IgA nephropathy (final pathology pending at time of discharge). He was discharged with improving creatinine and instructions to continue sodium bicarb and follow up with nephrology. Seen at [facility] for hospital follow up where labs were obtained and now showing again elevated creatinine and decreased CO2. It appears labs were similar at time of discharge on 2/29: CO2 of 16, creatinine 4.57    Discussed with physician who saw patient at hospital follow up. Discussed that there certainly is a question of medication compliance. Advised I call patient to clarify - upon my discussion with patient's brother (who is his decision maker) it appears the patient has not been taking this medication since his discharge. Possibly sent to the wrong pharmacy? They request we send it to his preferred pharmacy which I have done.     Discussed patient would benefit from a close in-person follow up visit with repeat labs. Also will need to verify patient understands importance of taking sodium bicarb.     Plan:   Sodium bicarb refilled - resume taking 650 mg BID   Close follow up at [facility] with repeat labs - message sent to scheduling team     Tyesha Rodriguez MD PGY-2  [facility]       Patient/Caregiver provided printed discharge information.

## 2024-03-14 ENCOUNTER — MEDICAL CORRESPONDENCE (OUTPATIENT)
Dept: HEALTH INFORMATION MANAGEMENT | Facility: CLINIC | Age: 44
End: 2024-03-14

## 2024-03-18 ENCOUNTER — DOCUMENTATION ONLY (OUTPATIENT)
Dept: FAMILY MEDICINE | Facility: CLINIC | Age: 44
End: 2024-03-18

## 2024-03-18 NOTE — PROGRESS NOTES
Forms Request:  Today's Date: March 18, 2024   Form Type: Home Care Orders, 3/11/24 No OT needs identified at this time.   Where is the form from: [facility]  How was form received: Fax  JASWANT on file: NO n/a  How is form being returned: Fax  Fax Number: [phone]  PCP: Patricia Mason   Color Team: Blue Team  Form Given to: Call Center

## 2024-03-21 ENCOUNTER — DOCUMENTATION ONLY (OUTPATIENT)
Dept: FAMILY MEDICINE | Facility: CLINIC | Age: 44
End: 2024-03-21

## 2024-03-21 ENCOUNTER — TELEPHONE (OUTPATIENT)
Dept: FAMILY MEDICINE | Facility: CLINIC | Age: 44
End: 2024-03-21
Payer: MEDICARE

## 2024-03-21 ENCOUNTER — MEDICAL CORRESPONDENCE (OUTPATIENT)
Dept: HEALTH INFORMATION MANAGEMENT | Facility: CLINIC | Age: 44
End: 2024-03-21

## 2024-03-21 NOTE — PROGRESS NOTES
Forms Request:  Today's Date: March 21, 2024   Form Type: Home Care Orders, Home Health Certification and Plan of Care: (3/4/24 - 5/2/24)   Where is the form from: [facility]  How was form received: Fax  JASWANT on file: NO n/a  How is form being returned: Fax  Fax Number: [phone]  PCP: Patricia Mason   Color Team: Blue Team  Form Given to: Call Center

## 2024-03-21 NOTE — TELEPHONE ENCOUNTER
Mike (Physical therapist) called to inform PCP that patient declined physical therapy. Thank is all. Thank you.  Kiara Ross on 3/21/2024 at 3:49 PM

## 2024-03-27 ENCOUNTER — DOCUMENTATION ONLY (OUTPATIENT)
Dept: FAMILY MEDICINE | Facility: CLINIC | Age: 44
End: 2024-03-27

## 2024-03-27 NOTE — PROGRESS NOTES
Forms Request:  Today's Date: March 27, 2024   Form Type: Home Care Orders, 3/21/24: PT Eval preformed... Goals: unmet due to pt refusal  Where is the form from: [facility]  How was form received: Fax  JASWANT on file: NO n/a  How is form being returned: Fax  Fax Number: [phone]  PCP: Patricia Mason   Color Team: Blue Team  Form Given to: Call Center

## 2024-03-28 ENCOUNTER — OFFICE VISIT (OUTPATIENT)
Dept: FAMILY MEDICINE | Facility: CLINIC | Age: 44
End: 2024-03-28
Payer: MEDICARE

## 2024-03-28 VITALS
RESPIRATION RATE: 18 BRPM | DIASTOLIC BLOOD PRESSURE: 87 MMHG | SYSTOLIC BLOOD PRESSURE: 127 MMHG | WEIGHT: 188.8 LBS | OXYGEN SATURATION: 99 % | HEART RATE: 77 BPM | TEMPERATURE: 98.3 F | HEIGHT: 66 IN | BODY MASS INDEX: 30.34 KG/M2

## 2024-03-28 DIAGNOSIS — F33.1 MAJOR DEPRESSIVE DISORDER, RECURRENT EPISODE, MODERATE (H): ICD-10-CM

## 2024-03-28 DIAGNOSIS — E87.20 METABOLIC ACIDOSIS: ICD-10-CM

## 2024-03-28 DIAGNOSIS — Z53.9 DIAGNOSIS NOT YET DEFINED: Primary | ICD-10-CM

## 2024-03-28 DIAGNOSIS — N02.B9 IGA NEPHROPATHY: ICD-10-CM

## 2024-03-28 DIAGNOSIS — N18.4 CKD (CHRONIC KIDNEY DISEASE) STAGE 4, GFR 15-29 ML/MIN (H): Primary | ICD-10-CM

## 2024-03-28 DIAGNOSIS — E87.5 HYPERKALEMIA: ICD-10-CM

## 2024-03-28 DIAGNOSIS — I10 ESSENTIAL HYPERTENSION: ICD-10-CM

## 2024-03-28 LAB
ANION GAP SERPL CALCULATED.3IONS-SCNC: 11 MMOL/L (ref 7–15)
BUN SERPL-MCNC: 48 MG/DL
CALCIUM SERPL-MCNC: 8.1 MG/DL
CHLORIDE SERPL-SCNC: 116 MMOL/L
CREAT SERPL-MCNC: 4.4 MG/DL
CREAT UR-MCNC: 102 MG/DL
DEPRECATED HCO3 PLAS-SCNC: 15 MMOL/L (ref 22–29)
EGFRCR SERPLBLD CKD-EPI 2021: 16 ML/MIN/1.73M2
GLUCOSE SERPL-MCNC: 98 MG/DL
MICROALBUMIN UR-MCNC: 1258 MG/L
MICROALBUMIN/CREAT UR: 1233.33 MG/G CR (ref 0–17)
POTASSIUM SERPL-SCNC: 5.1 MMOL/L (ref 3.4–5.3)
SODIUM SERPL-SCNC: 142 MMOL/L

## 2024-03-28 PROCEDURE — 82043 UR ALBUMIN QUANTITATIVE: CPT

## 2024-03-28 PROCEDURE — 83970 ASSAY OF PARATHORMONE: CPT

## 2024-03-28 PROCEDURE — 80048 BASIC METABOLIC PNL TOTAL CA: CPT

## 2024-03-28 PROCEDURE — 84100 ASSAY OF PHOSPHORUS: CPT

## 2024-03-28 PROCEDURE — 99214 OFFICE O/P EST MOD 30 MIN: CPT | Mod: GC

## 2024-03-28 PROCEDURE — G0180 MD CERTIFICATION HHA PATIENT: HCPCS | Performed by: FAMILY MEDICINE

## 2024-03-28 PROCEDURE — 82570 ASSAY OF URINE CREATININE: CPT

## 2024-03-28 PROCEDURE — 36415 COLL VENOUS BLD VENIPUNCTURE: CPT

## 2024-03-28 RX ORDER — CHLORTHALIDONE 25 MG/1
25 TABLET ORAL DAILY
Qty: 90 TABLET | Refills: 0 | Status: SHIPPED | OUTPATIENT
Start: 2024-03-28

## 2024-03-28 RX ORDER — LOSARTAN POTASSIUM 25 MG/1
25 TABLET ORAL DAILY
Qty: 90 TABLET | Refills: 3 | Status: SHIPPED | OUTPATIENT
Start: 2024-03-28

## 2024-03-28 NOTE — PROGRESS NOTES
Resident/Fellow Attestation   I, Kurt Son MD, was present with the medical/RODNEY student who participated in the service and in the documentation of the note.  I have verified the history and personally performed the physical exam and medical decision making.  I agree with the assessment and plan of care as documented in the note.      Follow up regarding CKD 2/2 recently diagnosed IgA nephropathy.   Labs from last visit came back with K 6.0 and bicarb 9. Patient had been off sodium bicarb, recommended to restart promptly and follow up next day for visit and lab repeat.   Unfortunately, patient has not been seen again until today. Reports feeling improved physically and mentally. Has been taking all medications as prescribed.   Fortunately, metabolic acidosis improved and hyperkalemia resolved.   BP still above goal despite adding back low dose losartan, will add chlorthalidone so as to not induce hyperkalemia.   Will see back in 1 month. Has initial outpatient nephrology visit scheduled for 4/10/2024.   Still pre-contemplative regarding smoking and EtOH cessation.     Kurt Son MD  PGY2  Date of Service (when I saw the patient): 03/28/24        Assessment & Plan     Daisy is a 43 year old male with a PMH of recently diagnosed IgA nephropathy and CKD (diagnosed at 2/23 hospital admission), prior CVA with right-sided hemiparesis, HTN, depression, and alcohol use disorder who presents for HTN follow up.    CKD (chronic kidney disease) stage 4, GFR 15-29 ml/min (H)  IgA nephropathy  Metabolic acidosis  Daisy reports increased energy since hospital discharge and is taking meds regularly. Is peeing regularly. At last visit, K+ was 6 and bicarb was 9, but these have improved to 5 and 15 respectively. Plan to continue current regimen and follow up with nephrology 4/10 and follow up in our clinic in 1 month.  - Parathyroid Hormone Intact  - Phosphorus  - Basic metabolic panel  - Albumin Random Urine Quantitative with  "Creat Ratio    Essential hypertension  BP slightly elevated in clinic today at 127/87. Has been taking amlodipine and losartan regularly. Plan to add chlorthalidone today. Plan to follow up in clinic in 1 month.  - Basic metabolic panel  - Add chlorthalidone     Depression  Pt reports mood has been good since last visit, denies sadness or anxiety.   -Continue Celexa, continue to address mood at future appointments    Return in about 4 weeks (around 4/25/2024) for Follow up.    Simon Villa is a 43 year old, presenting for the following health issues:  Hypertension (Follow-up no other concerns/)        3/28/2024     2:56 PM   Additional Questions   Roomed by Aldair         3/28/2024    Information    services provided? Yes   Language Hmong   Type of interpretation provided Face-to-face    name Aram Hansen    Agency [employer]     HPI   -energy is good since discharge, is able to move around the house  -upcoming nephrology appt 4/10  -is taking all of meds regularly (nephew verified) including his losartan, sodium bicarb  -not interested in PT  -urinates several times per day  -mood is good, denies depression or anxiety since last visit  -alcohol: 4-5 beers per day, not interested in reducing  -smokes 2-3 cigs per day, not interested in reducing  -is not checking BP at home      Objective    /87   Pulse 77   Temp 98.3  F (36.8  C)   Resp 18   Ht 1.676 m (5' 6\")   Wt 85.6 kg (188 lb 12.8 oz)   SpO2 99%   BMI 30.47 kg/m    Body mass index is 30.47 kg/m .  Physical Exam   GENERAL: mild scleral icterus noted; right-sided hemiparesis, slow speech; alert and no distress  NECK: no adenopathy, no asymmetry, masses, or scars  RESP: lungs clear to auscultation - no rales, rhonchi or wheezes  CV: regular rate and rhythm, normal S1 S2, no S3 or S4, no murmur, click or rub, no peripheral edema  ABDOMEN: soft, nontender, no hepatosplenomegaly, no masses and bowel sounds " normal  MS: no gross musculoskeletal defects noted, no edema    Results for orders placed or performed in visit on 03/28/24 (from the past 24 hour(s))   Basic metabolic panel   Result Value Ref Range    Sodium 142 mmol/L    Potassium 5.1 3.4 - 5.3 mmol/L    Chloride 116 mmol/L    Carbon Dioxide (CO2) 15 (L) 22 - 29 mmol/L    Anion Gap 11 7 - 15 mmol/L    Urea Nitrogen 48.0 mg/dL    Creatinine 4.40 mg/dL    GFR Estimate 16 (L) >60 mL/min/1.73m2    Calcium 8.1 mg/dL    Glucose 98 mg/dL           This pt was seen and discussed with Dr. Son.  Anna Marie Colindres, MS3  [employer]    Signed Electronically by: Kurt Son MD

## 2024-03-28 NOTE — PATIENT INSTRUCTIONS
Your kidney labs are looking better!   Keep taking all your medications as prescribed everyday.   ADD chlorthalidone 25 mg daily to help with your blood pressure and your potassium level.  We will see you back in one month!

## 2024-03-28 NOTE — PROGRESS NOTES
Date form completed: 3/21/24  Form sent via: Fax  Date faxed or mailed: 3/25/24  Fax # or Address: [phone]  Completed by: Shasha Crespo

## 2024-03-28 NOTE — PROGRESS NOTES
Preceptor Attestation:   Patient seen, evaluated and discussed with the resident. I have verified the content of the note, which accurately reflects my assessment of the patient and the plan of care.    Supervising Physician:Maya Begum MD    [facility]

## 2024-03-28 NOTE — PROGRESS NOTES
Date form completed: 3/14/24  Form sent via: Fax  Date faxed or mailed: 3/19/24  Fax # or Address: [phone]  Completed by: Shasha Crespo

## 2024-03-29 LAB
PHOSPHATE SERPL-MCNC: 5.3 MG/DL (ref 2.5–4.5)
PTH-INTACT SERPL-MCNC: 367 PG/ML (ref 15–65)

## 2024-04-03 NOTE — PROGRESS NOTES
Date form completed: 3/28/24  Form sent via: Fax  Date faxed or mailed: 3/28/24  Fax # or Address: [phone]  Completed by: Lola Watkins CMA

## 2024-04-03 NOTE — PROGRESS NOTES
Date form completed: 3/28/24  Form sent via: Fax  Date faxed or mailed: 3/28/24  Fax # or Address: [phone]  Completed by: Lola Watkins CMA

## 2024-04-09 ENCOUNTER — DOCUMENTATION ONLY (OUTPATIENT)
Dept: FAMILY MEDICINE | Facility: CLINIC | Age: 44
End: 2024-04-09

## 2024-04-09 NOTE — PROGRESS NOTES
Forms Request:  Today's Date: April 9, 2024   Form Type: Home Care Orders, Discharge from agency 4/8/24: discharging per patient request...  Where is the form from: [facility]  How was form received: Fax  JASWANT on file: NO n/a  How is form being returned: Fax  Fax Number: [phone]  PCP: Patricia Mason   Color Team: Purple Team  Form Given to: Call Center

## 2024-04-11 ENCOUNTER — MEDICAL CORRESPONDENCE (OUTPATIENT)
Dept: HEALTH INFORMATION MANAGEMENT | Facility: CLINIC | Age: 44
End: 2024-04-11

## 2024-04-19 NOTE — PROGRESS NOTES
Date form completed: 4/11/24  Form sent via: Fax  Date faxed or mailed: 4/15/24  Fax # or Address: [phone]  Completed by: Lola Watkins CMA

## 2025-01-29 ENCOUNTER — OFFICE VISIT (OUTPATIENT)
Dept: FAMILY MEDICINE | Facility: CLINIC | Age: 45
End: 2025-01-29
Payer: MEDICARE

## 2025-01-29 VITALS
TEMPERATURE: 98.8 F | BODY MASS INDEX: 31.32 KG/M2 | DIASTOLIC BLOOD PRESSURE: 87 MMHG | RESPIRATION RATE: 18 BRPM | OXYGEN SATURATION: 98 % | SYSTOLIC BLOOD PRESSURE: 125 MMHG | WEIGHT: 188 LBS | HEART RATE: 66 BPM | HEIGHT: 65 IN

## 2025-01-29 DIAGNOSIS — Z01.818 PREOP GENERAL PHYSICAL EXAM: Primary | ICD-10-CM

## 2025-01-29 DIAGNOSIS — Z99.2 ESRD (END STAGE RENAL DISEASE) ON DIALYSIS (H): ICD-10-CM

## 2025-01-29 DIAGNOSIS — F10.20 ALCOHOL DEPENDENCE, CONTINUOUS (H): ICD-10-CM

## 2025-01-29 DIAGNOSIS — N18.6 ESRD (END STAGE RENAL DISEASE) ON DIALYSIS (H): ICD-10-CM

## 2025-01-29 PROBLEM — E83.39 HYPERPHOSPHATEMIA: Status: ACTIVE | Noted: 2024-11-27

## 2025-01-29 PROBLEM — N18.5 CKD (CHRONIC KIDNEY DISEASE), STAGE V (H): Status: ACTIVE | Noted: 2025-01-29

## 2025-01-29 PROBLEM — N17.9 ACUTE RENAL FAILURE SUPERIMPOSED ON CHRONIC KIDNEY DISEASE: Status: ACTIVE | Noted: 2024-02-24

## 2025-01-29 PROBLEM — R52 PAIN: Status: ACTIVE | Noted: 2024-11-25

## 2025-01-29 PROBLEM — E87.6 ACUTE HYPOKALEMIA: Status: ACTIVE | Noted: 2024-11-25

## 2025-01-29 PROBLEM — R21 RASH: Status: ACTIVE | Noted: 2024-11-25

## 2025-01-29 PROBLEM — N18.9 ANEMIA IN CHRONIC KIDNEY DISEASE: Status: ACTIVE | Noted: 2024-11-25

## 2025-01-29 PROBLEM — E83.42 HYPOMAGNESEMIA: Status: ACTIVE | Noted: 2024-11-25

## 2025-01-29 PROBLEM — D63.1 ANEMIA IN CHRONIC KIDNEY DISEASE: Status: ACTIVE | Noted: 2024-11-25

## 2025-01-29 PROBLEM — N18.9 ACUTE RENAL FAILURE SUPERIMPOSED ON CHRONIC KIDNEY DISEASE: Status: ACTIVE | Noted: 2024-02-24

## 2025-01-29 PROBLEM — E83.51 HYPOCALCEMIA: Status: ACTIVE | Noted: 2024-11-27

## 2025-01-29 PROBLEM — R19.5 DARK STOOLS: Status: ACTIVE | Noted: 2024-11-26

## 2025-01-29 PROBLEM — M62.81 GENERALIZED MUSCLE WEAKNESS: Status: ACTIVE | Noted: 2024-11-25

## 2025-01-29 PROBLEM — D64.9 ANEMIA: Status: ACTIVE | Noted: 2024-11-25

## 2025-01-29 ASSESSMENT — PATIENT HEALTH QUESTIONNAIRE - PHQ9
10. IF YOU CHECKED OFF ANY PROBLEMS, HOW DIFFICULT HAVE THESE PROBLEMS MADE IT FOR YOU TO DO YOUR WORK, TAKE CARE OF THINGS AT HOME, OR GET ALONG WITH OTHER PEOPLE: NOT DIFFICULT AT ALL
SUM OF ALL RESPONSES TO PHQ QUESTIONS 1-9: 3
SUM OF ALL RESPONSES TO PHQ QUESTIONS 1-9: 3

## 2025-01-29 ASSESSMENT — ENCOUNTER SYMPTOMS
CARDIOVASCULAR NEGATIVE: 1
RESPIRATORY NEGATIVE: 1
CONSTITUTIONAL NEGATIVE: 1

## 2025-01-29 NOTE — PROGRESS NOTES
Preoperative Evaluation  [facility] [address] [place] [address]  Phone: [phone]  Fax: [phone]  Primary Provider: Patricia Mason MD  Pre-op Performing Provider: Darryn Duvall MD  Jan 29, 2025 1/29/2025   Surgical Information   What procedure is being done? Install fistula   Facility or Hospital where procedure/surgery will be performed: [facility]   Who is doing the procedure / surgery? Dr. Castro   Date of surgery / procedure: Feb. 4th,2025   Time of surgery / procedure: 10:30am   Where do you plan to recover after surgery? at home with family     Fax number for surgical facility: Note does not need to be faxed, will be available electronically in Epic.    Assessment & Plan     The proposed surgical procedure is considered INTERMEDIATE risk.    1. Preop general physical exam (Primary)  Patient is moderate risk for moderate to low risk surgery.  No medications needed to be held.  Recent EKG at [facility] report reviewed.    2. ESRD (end stage renal disease) on dialysis (H)    3. Alcohol dependence, continuous (H)  No recent history of withdrawal.  Encouraged decreasing use.  Continue to monitor in the perioperative time.        Risks and Recommendations  The patient has the following additional risks and recommendations for perioperative complications:  Social and Substance:    - Alcohol abuse and risk of withdrawal         Recommendation  Approval given to proceed with proposed procedure, without further diagnostic evaluation.    Subjective   Daisy is a 44 year old, presenting for the following:  Pre-Op Exam (Surgery for his left arm to enlarge the vein for dialysis. [address] 2/4/2025. Dr. Marshall Castro. Fax#: [phone])      Continues to have 4-5 beer every 4-5 hours        1/29/2025    11:30 AM   Additional Questions   Roomed by Jack   Accompanied by Son         1/29/2025     "Information    services provided? Yes   Language Hmong   Type of interpretation provided Face-to-face    name Suvone    Agency [employer]     HPI related to upcoming procedure: goes to dialysis [facility]        1/29/2025   Pre-Op Questionnaire   Have you ever had a heart attack or stroke? (!) YES stroke, 10 years ago   Have you ever had surgery on your heart or blood vessels, such as a stent placement, a coronary artery bypass, or surgery on an artery in your head, neck, heart, or legs? No   Do you have chest pain with activity? No   Do you have a history of heart failure? No   Do you currently have a cold, bronchitis or symptoms of other infection? No   Do you have a cough, shortness of breath, or wheezing? No   Do you or anyone in your family have previous history of blood clots? No   Do you or does anyone in your family have a serious bleeding problem such as prolonged bleeding following surgeries or cuts? No   Have you ever had problems with anemia or been told to take iron pills? No   Have you had any abnormal blood loss such as black, tarry or bloody stools? No   Have you ever had a blood transfusion? No   Are you willing to have a blood transfusion if it is medically needed before, during, or after your surgery? (!) NO - \"just doesn't want it\"   Have you or any of your relatives ever had problems with anesthesia? No   Do you have sleep apnea, excessive snoring or daytime drowsiness? No   Do you have any artifical heart valves or other implanted medical devices like a pacemaker, defibrillator, or continuous glucose monitor? No   Do you have artificial joints? No   Are you allergic to latex? No     Health Care Directive  Patient does not have a Health Care Directive: Discussed advance care planning with patient; however, patient declined at this time.    Preoperative Review of    reviewed - no record of controlled substances prescribed.      Status of Chronic " Conditions:  ANEMIA - Patient has a recent history of moderate-severe anemia, which has not been symptomatic. Work up to date has revealed that it is kidney related. Treatment has been followed by nephrology.     Patient Active Problem List    Diagnosis Date Noted    Anemia, chronic disease 03/12/2024     Priority: Medium    IgA nephropathy 03/12/2024     Priority: Medium    CKD (chronic kidney disease) stage 4, GFR 15-29 ml/min (H) 03/12/2024     Priority: Medium    Alcohol dependence, continuous (H) 03/12/2024     Priority: Medium    Seizure disorder (H) 03/12/2024     Priority: Medium    Chronic kidney disease, stage 3a (H) 02/29/2024     Priority: Medium    Secondary renal hyperparathyroidism 02/24/2024     Priority: Medium    Proteinuria 02/24/2024     Priority: Medium    Hematuria 02/24/2024     Priority: Medium    Fatty liver 06/12/2023     Priority: Medium    Other microscopic hematuria 06/12/2023     Priority: Medium    Tobacco use disorder 06/04/2015     Priority: Medium    Hemiparesis affecting right side as late effect of cerebrovascular accident (H) 07/15/2014     Priority: Medium    Essential hypertension, malignant 12/20/2012     Priority: Medium    Anoxic brain damage (H) 12/20/2012     Priority: Medium    Generalized convulsive epilepsy (H) 12/20/2012     Priority: Medium     Problem list name updated by automated process. Provider to review      Intracranial hemorrhage (H) 12/20/2012     Priority: Medium     Problem list name updated by automated process. Provider to review      Major depressive disorder, recurrent episode, moderate (H) 12/20/2012     Priority: Medium    Alcohol abuse 10/23/2012     Priority: Medium    Adiposity 03/15/2010     Priority: Medium      Past Medical History:   Diagnosis Date    Anoxic brain damage (H) 12/20/2012    Essential hypertension, malignant 12/20/2012    Generalized convulsive epilepsy without mention of intractable epilepsy 12/20/2012    Major depressive  "disorder, recurrent episode, moderate (H) 12/20/2012    Unspecified intracranial hemorrhage 12/20/2012     Past Surgical History:   Procedure Laterality Date    IR CVC TUNNEL W2 CATH W/O PORT  11/27/2024    NO HISTORY OF SURGERY       Current Outpatient Medications   Medication Sig Dispense Refill    amLODIPine (NORVASC) 5 MG tablet Take 1 tablet (5 mg) by mouth daily 90 tablet 3    chlorthalidone (HYGROTON) 25 MG tablet Take 1 tablet (25 mg) by mouth daily 90 tablet 0    citalopram (CELEXA) 40 MG tablet Take 1 tablet (40 mg) by mouth daily 90 tablet 3    ketoconazole (NIZORAL) 2 % external cream Apply topically 2 times daily Apply to left arm, neck and chest twice a day 60 g 1    levETIRAcetam (KEPPRA) 1000 MG tablet Take 1 tablet (1,000 mg) by mouth 2 times daily 180 tablet 3    losartan (COZAAR) 25 MG tablet Take 1 tablet (25 mg) by mouth daily 90 tablet 3    metoprolol tartrate (LOPRESSOR) 100 MG tablet Take 1 tablet (100 mg) by mouth 2 times daily 180 tablet 3    order for DME Equipment being ordered:     Redo right leg AFO    [employer] to assess 1 Device 0    order for DME Equipment being ordered: New AFO brace 1 Units 0    sodium bicarbonate 650 MG tablet Take 1 tablet (650 mg) by mouth 2 times daily 180 tablet 3       No Known Allergies     Social History     Tobacco Use    Smoking status: Every Day     Types: Cigarettes    Smokeless tobacco: Never    Tobacco comments:     6-7 per day.    Substance Use Topics    Alcohol use: Yes     Alcohol/week: 0.0 standard drinks of alcohol     Comment: 6-7 beers daily.        History   Drug Use No           Review of Systems   Constitutional: Negative.    Respiratory: Negative.     Cardiovascular: Negative.         Objective    /87 (BP Location: Left arm, Patient Position: Sitting)   Pulse 66   Temp 98.8  F (37.1  C) (Tympanic)   Resp 18   Ht 1.65 m (5' 4.96\")   Wt 85.3 kg (188 lb)   SpO2 98%   BMI 31.32 kg/m     Estimated body mass index is 31.32 " "kg/m  as calculated from the following:    Height as of this encounter: 1.65 m (5' 4.96\").    Weight as of this encounter: 85.3 kg (188 lb).  Physical Exam  Vitals and nursing note reviewed.   Constitutional:       General: He is not in acute distress.     Appearance: Normal appearance. He is not ill-appearing, toxic-appearing or diaphoretic.   HENT:      Mouth/Throat:      Mouth: Mucous membranes are moist.      Comments: Mallampati 4  Cardiovascular:      Rate and Rhythm: Normal rate.      Pulses: Normal pulses.      Heart sounds: No murmur heard.     No friction rub. No gallop.   Pulmonary:      Effort: Pulmonary effort is normal. No respiratory distress.      Breath sounds: No stridor. No wheezing or rales.   Neurological:      Mental Status: He is alert and oriented to person, place, and time.         Recent Labs   Lab Test 03/28/24  1503 03/12/24  1441   HGB  --  8.2*   PLT  --  224    135   POTASSIUM 5.1 6.0*   CR 4.40 3.69*        Diagnostics  No labs were ordered during this visit.   No EKG this visit, completed in the last 90 days. At [facility].    Revised Cardiac Risk Index (RCRI)  The patient has the following serious cardiovascular risks for perioperative complications:   - Cerebrovascular Disease (TIA or CVA) = 1 point   - Serum Creatinine >2.0 mg/dl = 1 point     RCRI Interpretation: 2 points: Class III (moderate risk - 6.6% complication rate)     Estimated Functional Capacity: Duke Activity Status Index (DASI) score: 3.5            Signed Electronically by: Darryn Duvall MD  A copy of this evaluation report is provided to the requesting physician.        "

## 2025-04-09 ENCOUNTER — TELEPHONE (OUTPATIENT)
Dept: FAMILY MEDICINE | Facility: CLINIC | Age: 45
End: 2025-04-09
Payer: MEDICARE

## 2025-04-09 NOTE — TELEPHONE ENCOUNTER
Spoke with patient's brother and was informed that the patient was back in the hospital. Patient has been in and out of [facility] 4 times this year alone. No hospital follow up has been done for any of the visits. Asked patient's brother to call us when patient is discharged so we can schedule a hospital follow up. (It will need to be a 40 min appt).     Lola Watkins CMA  Clinical Care Coordinator

## 2025-04-22 ENCOUNTER — LAB REQUISITION (OUTPATIENT)
Dept: LAB | Facility: CLINIC | Age: 45
End: 2025-04-22

## 2025-04-22 PROCEDURE — 87040 BLOOD CULTURE FOR BACTERIA: CPT | Performed by: INTERNAL MEDICINE

## 2025-04-24 LAB — BACTERIA BLD CULT: NORMAL

## 2025-04-27 LAB — BACTERIA BLD CULT: NO GROWTH

## 2025-04-28 ENCOUNTER — LAB REQUISITION (OUTPATIENT)
Dept: LAB | Facility: CLINIC | Age: 45
End: 2025-04-28

## 2025-04-28 PROCEDURE — 87040 BLOOD CULTURE FOR BACTERIA: CPT | Performed by: INTERNAL MEDICINE

## 2025-05-01 ENCOUNTER — OFFICE VISIT (OUTPATIENT)
Dept: FAMILY MEDICINE | Facility: CLINIC | Age: 45
End: 2025-05-01
Payer: MEDICARE

## 2025-05-01 VITALS
HEIGHT: 65 IN | BODY MASS INDEX: 29.82 KG/M2 | DIASTOLIC BLOOD PRESSURE: 78 MMHG | RESPIRATION RATE: 18 BRPM | OXYGEN SATURATION: 96 % | TEMPERATURE: 98 F | SYSTOLIC BLOOD PRESSURE: 146 MMHG | HEART RATE: 91 BPM | WEIGHT: 179 LBS

## 2025-05-01 DIAGNOSIS — I10 ESSENTIAL HYPERTENSION: ICD-10-CM

## 2025-05-01 DIAGNOSIS — Z99.2 ESRD (END STAGE RENAL DISEASE) ON DIALYSIS (H): ICD-10-CM

## 2025-05-01 DIAGNOSIS — G40.909 SEIZURE DISORDER (H): ICD-10-CM

## 2025-05-01 DIAGNOSIS — N17.9 ACUTE RENAL FAILURE SUPERIMPOSED ON STAGE 5 CHRONIC KIDNEY DISEASE, NOT ON CHRONIC DIALYSIS, UNSPECIFIED ACUTE RENAL FAILURE TYPE (H): ICD-10-CM

## 2025-05-01 DIAGNOSIS — I48.0 PAROXYSMAL ATRIAL FIBRILLATION (H): ICD-10-CM

## 2025-05-01 DIAGNOSIS — N18.5 CKD (CHRONIC KIDNEY DISEASE), STAGE V (H): Primary | ICD-10-CM

## 2025-05-01 DIAGNOSIS — N18.6 ESRD (END STAGE RENAL DISEASE) ON DIALYSIS (H): ICD-10-CM

## 2025-05-01 DIAGNOSIS — F33.1 MAJOR DEPRESSIVE DISORDER, RECURRENT EPISODE, MODERATE (H): ICD-10-CM

## 2025-05-01 DIAGNOSIS — N18.5 ACUTE RENAL FAILURE SUPERIMPOSED ON STAGE 5 CHRONIC KIDNEY DISEASE, NOT ON CHRONIC DIALYSIS, UNSPECIFIED ACUTE RENAL FAILURE TYPE (H): ICD-10-CM

## 2025-05-01 DIAGNOSIS — I69.351 HEMIPARESIS AFFECTING RIGHT SIDE AS LATE EFFECT OF CEREBROVASCULAR ACCIDENT (H): ICD-10-CM

## 2025-05-01 DIAGNOSIS — M24.541 CONTRACTURE OF HAND JOINT, RIGHT: ICD-10-CM

## 2025-05-01 PROBLEM — D64.9 ANEMIA: Status: RESOLVED | Noted: 2024-11-25 | Resolved: 2025-05-01

## 2025-05-01 PROBLEM — T82.7XXA BACTEREMIA ASSOCIATED WITH INTRAVASCULAR LINE: Status: RESOLVED | Noted: 2025-04-14 | Resolved: 2025-05-01

## 2025-05-01 PROBLEM — E83.39 HYPERPHOSPHATEMIA: Status: RESOLVED | Noted: 2024-11-27 | Resolved: 2025-05-01

## 2025-05-01 PROBLEM — D63.8 ANEMIA, CHRONIC DISEASE: Status: RESOLVED | Noted: 2024-03-12 | Resolved: 2025-05-01

## 2025-05-01 PROBLEM — N18.4 CKD (CHRONIC KIDNEY DISEASE) STAGE 4, GFR 15-29 ML/MIN (H): Status: RESOLVED | Noted: 2024-03-12 | Resolved: 2025-05-01

## 2025-05-01 PROBLEM — B95.2 BACTEREMIA DUE TO ENTEROCOCCUS: Status: ACTIVE | Noted: 2025-04-06

## 2025-05-01 PROBLEM — R52 PAIN: Status: RESOLVED | Noted: 2024-11-25 | Resolved: 2025-05-01

## 2025-05-01 PROBLEM — R78.81 BACTEREMIA DUE TO ENTEROCOCCUS: Status: RESOLVED | Noted: 2025-04-06 | Resolved: 2025-05-01

## 2025-05-01 PROBLEM — R21 RASH: Status: RESOLVED | Noted: 2024-11-25 | Resolved: 2025-05-01

## 2025-05-01 PROBLEM — F10.20 ALCOHOL USE DISORDER, SEVERE, DEPENDENCE (H): Status: ACTIVE | Noted: 2025-02-26

## 2025-05-01 PROBLEM — R78.81 BACTEREMIA ASSOCIATED WITH INTRAVASCULAR LINE: Status: ACTIVE | Noted: 2025-04-14

## 2025-05-01 PROBLEM — T82.7XXA BACTEREMIA ASSOCIATED WITH INTRAVASCULAR LINE: Status: ACTIVE | Noted: 2025-04-14

## 2025-05-01 PROBLEM — I15.0 RENOVASCULAR HYPERTENSION: Status: ACTIVE | Noted: 2024-02-24

## 2025-05-01 PROBLEM — R78.81 BACTEREMIA DUE TO ENTEROCOCCUS: Status: ACTIVE | Noted: 2025-04-06

## 2025-05-01 PROBLEM — A49.8 ENTEROCOCCUS FAECALIS INFECTION: Status: RESOLVED | Noted: 2025-04-15 | Resolved: 2025-05-01

## 2025-05-01 PROBLEM — E83.51 HYPOCALCEMIA: Status: RESOLVED | Noted: 2024-11-27 | Resolved: 2025-05-01

## 2025-05-01 PROBLEM — T82.7XXA: Status: ACTIVE | Noted: 2025-03-04

## 2025-05-01 PROBLEM — R80.9 PROTEINURIA: Status: RESOLVED | Noted: 2024-02-24 | Resolved: 2025-05-01

## 2025-05-01 PROBLEM — F32.9 MAJOR DEPRESSIVE DISORDER: Status: ACTIVE | Noted: 2025-03-31

## 2025-05-01 PROBLEM — E83.42 HYPOMAGNESEMIA: Status: RESOLVED | Noted: 2024-11-25 | Resolved: 2025-05-01

## 2025-05-01 PROBLEM — E87.6 ACUTE HYPOKALEMIA: Status: RESOLVED | Noted: 2024-11-25 | Resolved: 2025-05-01

## 2025-05-01 PROBLEM — R31.29 OTHER MICROSCOPIC HEMATURIA: Status: RESOLVED | Noted: 2023-06-12 | Resolved: 2025-05-01

## 2025-05-01 PROBLEM — N18.31 CHRONIC KIDNEY DISEASE, STAGE 3A (H): Status: RESOLVED | Noted: 2024-02-29 | Resolved: 2025-05-01

## 2025-05-01 PROBLEM — M62.81 GENERALIZED MUSCLE WEAKNESS: Status: RESOLVED | Noted: 2024-11-25 | Resolved: 2025-05-01

## 2025-05-01 PROBLEM — B95.2 BACTEREMIA DUE TO ENTEROCOCCUS: Status: RESOLVED | Noted: 2025-04-06 | Resolved: 2025-05-01

## 2025-05-01 PROBLEM — R78.81 BACTEREMIA ASSOCIATED WITH INTRAVASCULAR LINE: Status: RESOLVED | Noted: 2025-04-14 | Resolved: 2025-05-01

## 2025-05-01 PROBLEM — A49.8 ENTEROCOCCUS FAECALIS INFECTION: Status: ACTIVE | Noted: 2025-04-15

## 2025-05-01 PROBLEM — S06.9XAA TRAUMATIC BRAIN INJURY (H): Status: ACTIVE | Noted: 2025-03-08

## 2025-05-01 LAB
BACTERIA BLD CULT: NORMAL
BACTERIA BLD CULT: NORMAL

## 2025-05-01 RX ORDER — AMLODIPINE BESYLATE 5 MG/1
5 TABLET ORAL DAILY
COMMUNITY
Start: 2025-05-01 | End: 2025-05-01 | Stop reason: ALTCHOICE

## 2025-05-01 RX ORDER — ATORVASTATIN CALCIUM 40 MG/1
40 TABLET, FILM COATED ORAL DAILY
COMMUNITY
Start: 2025-03-31 | End: 2026-03-31

## 2025-05-01 RX ORDER — ALBUTEROL SULFATE 90 UG/1
2 INHALANT RESPIRATORY (INHALATION) EVERY 4 HOURS PRN
COMMUNITY
Start: 2025-04-15 | End: 2025-05-01

## 2025-05-01 RX ORDER — CITALOPRAM HYDROBROMIDE 40 MG/1
40 TABLET ORAL DAILY
COMMUNITY
Start: 2025-05-01

## 2025-05-01 RX ORDER — METOPROLOL TARTRATE 100 MG/1
100 TABLET ORAL 2 TIMES DAILY
COMMUNITY
Start: 2025-05-01 | End: 2025-05-01 | Stop reason: ALTCHOICE

## 2025-05-01 RX ORDER — LEVETIRACETAM 1000 MG/1
1000 TABLET ORAL DAILY
COMMUNITY
Start: 2025-05-01

## 2025-05-01 RX ORDER — HYDRALAZINE HYDROCHLORIDE 25 MG/1
25 TABLET, FILM COATED ORAL 2 TIMES DAILY
COMMUNITY
Start: 2024-09-08 | End: 2025-05-01

## 2025-05-01 RX ORDER — ASPIRIN 81 MG/1
81 TABLET, CHEWABLE ORAL DAILY
COMMUNITY
Start: 2025-03-31

## 2025-05-01 RX ORDER — DIPHENHYDRAMINE HYDROCHLORIDE, ZINC ACETATE 2; .1 G/100G; G/100G
CREAM TOPICAL DAILY PRN
COMMUNITY
Start: 2025-03-31

## 2025-05-01 RX ORDER — GAUZE BANDAGE 2" X 2"
100 BANDAGE TOPICAL DAILY
COMMUNITY
Start: 2025-03-31

## 2025-05-01 RX ORDER — SERTRALINE HYDROCHLORIDE 25 MG/1
75 TABLET, FILM COATED ORAL DAILY
COMMUNITY
Start: 2025-03-31 | End: 2026-03-31

## 2025-05-01 RX ORDER — METOPROLOL TARTRATE 25 MG/1
6.25 TABLET, FILM COATED ORAL 2 TIMES DAILY
COMMUNITY
Start: 2025-05-01

## 2025-05-01 RX ORDER — SODIUM BICARBONATE 650 MG/1
650 TABLET ORAL 2 TIMES DAILY
COMMUNITY
Start: 2025-05-01

## 2025-05-01 RX ORDER — HYDROXYZINE PAMOATE 25 MG/1
25 CAPSULE ORAL 4 TIMES DAILY PRN
COMMUNITY
Start: 2025-03-31

## 2025-05-01 NOTE — PROGRESS NOTES
DME ORDER  What was ordered? Wheelchair, Orthotics shoe, and Wrist/Hand orthosis  What location did patient pick? [facility]  Was copy given to patient? Yes  Was DME order copy faxed to patient preferred DME location? Yes  What is the fax number for preferred location? [phone]     Use dotphrase DMEPHALEN for top 3 Addresses

## 2025-05-02 NOTE — PROGRESS NOTES
Assessment & Plan     (N18.5) CKD (chronic kidney disease), stage V (H)  (primary encounter diagnosis)  Comment: records document he's dialysis at [facility]  Plan: no labs done today, no access to [facility], just [facility], family to call and confirm his medication list    (I10) Essential hypertension  Comment: unsure of meds but reviewed discharge summary  Plan: metoprolol tartrate (LOPRESSOR) 25 mg tablet  take 0.25 BID              See discharge summary but no nephrology records to have family call to clarify    (G49.424) Seizure disorder (H)  Comment: corrected dose based on discharge summary, now with worsening kidney dz  Plan: levETIRAcetam (KEPPRA) 1000 MG tablet        Updated and family to call    (F33.1) Major depressive disorder, recurrent episode, moderate (H)  Comment: stopped previous celexa per med list  Plan: sertraline 75 mg on discharge summary            (N17.9,  N18.5) Acute renal failure superimposed on stage 5 chronic kidney disease, not on chronic dialysis, unspecified acute renal failure type (H)  Comment: does not look in distress or volume overload or dry  Plan: obtain records    (I48.0) Paroxysmal atrial fibrillation (H)  Comment: sounding regular and not on med list for anticoagulation: perhaps with other possibly liver cirrhosis  Plan: obtain perhaps echo-had SEBAS reported negative (for IV abx)    (I69.351) Hemiparesis affecting right side as late effect of cerebrovascular accident (H)  Comment: agree important to get better upper hand care (splint/use of washcloth to help skin/fingernails)  Needing new PRAFO and shoes that can work over brace  Home OT to help educate family on care of hand contracture  Plan: Wheelchair Order for DME - ONLY FOR DME, [facility]         Statement of Certifying Physician - FOR         ORTHOTICS USE ONLY, Orthotics, Mastectomy and         Custom Compression Orders Type: Orthotic;         Orthotic Type Requested: PRAFO, Wrist/Hand         Orthosis; PRAFO  "Laterality: Right; Wrist/Hand         Orthosis Laterality: Right, Home Care Referral            (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    The longitudinal plan of care for the diagnosis(es)/condition(s) as documented were addressed during this visit. Due to the added complexity in care, I will continue to support Daisy in the subsequent management and with ongoing continuity of care.    MED REC REQUIRED  Post Medication Reconciliation Status: unable to reconcile discharge medications due to family unsure of names of meds, reconcilled as able with med list and discharge summary  Nicotine/Tobacco Cessation  He reports that he has been smoking cigarettes. He has never used smokeless tobacco.  Nicotine/Tobacco Cessation Plan  Patient declines      BMI  Estimated body mass index is 29.82 kg/m  as calculated from the following:    Height as of this encounter: 1.65 m (5' 4.96\").    Weight as of this encounter: 81.2 kg (179 lb).             Subjective   Daisy is a 44 year old, presenting for the following health issues:  Hospital F/U (For Tachardia and venous infection due to dialysis catheter )      5/1/2025     3:50 PM   Additional Questions   Roomed by Paw   Accompanied by Son         5/1/2025    Information    services provided? Yes   Language Hmong   Type of interpretation provided Face-to-face    name Rekha Sun    Agency [employer] [facility]     Via the Health Maintenance questionnaire, the patient has reported the following services have been completed -Colonscopy: yolie 2019-08-19, " "this information has been sent to the abstraction team.  HPI            Hospital discharge  [facility]:  Psych hospitalization 3/3-3/31 for depression and suicide attempt then  4/5-4/15/2025 readmitted with sepsis CLABSI enterococcus  Today with nephew and   Denies any concerns, was just today at [facility] ID: all done with IV antibiotics  STates not going to any other clinic visits:  See patient has dialysis [facility], last notes 4/21/25    Does not get out of the house much, no ambulation beyond going out the front door to smoke outside    Mood is \"the same\" per patient    Nephew with no concerns states takes 8 different medications (some BID or TID) states in general seems to take medications without issues      Requesting wheel chair, using some postop shoes \"no shoes fit\" because of my old AFO (over 10 years old)    Review of Systems  Constitutional, HEENT, cardiovascular, pulmonary, GI, , musculoskeletal, neuro, skin, endocrine and psych systems are negative, except as otherwise noted.        Objective    BP (!) 146/78   Pulse 91   Temp 98  F (36.7  C) (Oral)   Resp 18   Ht 1.65 m (5' 4.96\")   Wt 81.2 kg (179 lb)   SpO2 96%   BMI 29.82 kg/m    Body mass index is 29.82 kg/m .  Physical Exam   GENERAL: alert, no distress, appears older than stated age, and obvious facial droop left with right arm contracture  RESP: lungs clear to auscultation - no rales, rhonchi or wheezes  CV: regular rate and rhythm, normal S1 S2, no S3 or S4, no murmur, click or rub, no peripheral edema  MS: decreased range of motion right shoulder, elbow, wrist and fingers and decreased movement right leg   NEURO: dysarthria, scant movement of left face, gait abnormal: right leg short swinging gait, using AFO on right, and right hand and fingers contracted with some palm maceration and long fingernails with debris,   PSYCH: affect flat and slight sarcasm in answers, kendra answers when acknowledges he wishes for a different " life    Component  Ref Range & Units 12:01 PM   WBC  3.5 - 10.5 x10(9)/L 6   RBC  4.32 - 5.72 x10(12)/L 2.87 Low    Hemoglobin  13.5 - 17.5 g/dL 9.1 Low    HCT  38.8 - 50.0 % 26.9 Low    MCV  80.0 - 100.0 fL 93.7   MCH  27.6 - 33.3 pg 31.7   MCHC  31.5 - 35.2 g/dL 33.8   RDW  11.9 - 15.5 % 13.9   Platelets  150 - 450 x10(9)/L 187   Neutrophil Absolute  1.7 - 7.0 10(9)/L 3.4   Lymphocyte Absolute  1.0 - 4.8 10(9)/L 1.9   Monocyte Absolute  0.2 - 0.9 10(9)/L 0.3   Eosinophil Absolute  0.0 - 0.5 10(9)/L 0.4   Basophil Absolute  0.0 - 0.3 10(9)/L 0.1   Immature Granulocyte %  0.0 - 0.5 % 0.2   Resulting [facility]     ontains critical data Basic Metabolic Panel  Order: [id number]  Component  Ref Range & Units 2 wk ago   Sodium  136 - 145 mmol/L 137   Potassium  3.5 - 5.1 mmol/L 4.2   Chloride  98 - 109 mmol/L 99   CO2  20 - 29 mmol/L 22   Anion Gap  6 - 16 mmol/L 16   Calcium  8.4 - 10.4 mg/dL 8.4   BUN  7 - 26 mg/dL 26   Creatinine  0.73 - 1.18 mg/dL 8.01 High Panic    Glucose  70 - 100 mg/dL 78   Comment: The given reference range is for the fasting state. Non-fasting reference range for glucose is 70 - 180 mg/dL.   GFR, Estimated  >60 mL/min/1.73m2 8 Low    Resulting [facility]       Signed Electronically by: Patricia Mason MD

## 2025-05-03 LAB
BACTERIA BLD CULT: NO GROWTH
BACTERIA BLD CULT: NO GROWTH

## 2025-05-06 ENCOUNTER — MEDICAL CORRESPONDENCE (OUTPATIENT)
Dept: HEALTH INFORMATION MANAGEMENT | Facility: CLINIC | Age: 45
End: 2025-05-06
Payer: MEDICARE

## 2025-05-06 ENCOUNTER — TELEPHONE (OUTPATIENT)
Dept: FAMILY MEDICINE | Facility: CLINIC | Age: 45
End: 2025-05-06
Payer: MEDICARE

## 2025-05-06 NOTE — TELEPHONE ENCOUNTER
Forms/Letter Request    Type of form/letter: DME (wheelchair, hospital bed)    Type of DME requested:     -  back 15-21 Wx16L  -  Rental w/c light weight 18x16 w/ht   -  Rental W/C accessory, height adjustable armrest  -  Rental W/C accessory, height adjustable armrest  -  Rental W/C accessory, anti tipper  -  Rental W/C accessory, seatbelt    Do we have the form/letter: Yes: Standard Written Order: Rehab Accessories    Who [facility] the form from? [facility]    Where did/will the form come from? form was faxed in    When is form/letter needed by: asap    How would you like the form/letter returned: Fax : [phone]    Patient Notified form requests are processed in 5-7 business days:No

## 2025-05-09 ENCOUNTER — TELEPHONE (OUTPATIENT)
Dept: FAMILY MEDICINE | Facility: CLINIC | Age: 45
End: 2025-05-09
Payer: MEDICARE

## 2025-05-09 NOTE — TELEPHONE ENCOUNTER
Daisy Taylor 28 year old      Medical clearance needed for dentist. Pt verbalized understanding, appt needed.   1/29/2025 4:10 PM Betsey Nice MD       Form in Dr. Nice's yellow folder.      Mel PALMA RN    Delay of care request requires PCP approval, routing to PCP for review and approval. Caro MCLEAN

## 2025-05-09 NOTE — TELEPHONE ENCOUNTER
Home Health Care    Reason for call:  Verbal orders     Orders are needed for this patient.      Delay and start of care for 5/12/25- unable to reach patient by phone     Pt Provider: Isabella    Phone Number Homecare Nurse can be reached at: [phone]      Okay to leave a detailed message?: Yes

## 2025-05-15 ENCOUNTER — TELEPHONE (OUTPATIENT)
Dept: FAMILY MEDICINE | Facility: CLINIC | Age: 45
End: 2025-05-15
Payer: MEDICARE

## 2025-05-15 NOTE — TELEPHONE ENCOUNTER
Forms/Letter Request    Type of form/letter: DME (wheelchair)    Type of DME requested: Wheelchair notes addendum    Do we have the form/letter: Yes:     Who is the form from? [employer] (if other please explain)    Where did/will the form come from? form was faxed in    When is form/letter needed by:     How would you like the form/letter returned: Fax : [phone]    Patient Notified form requests are processed in 5-7 business days:Yes

## 2025-05-19 ENCOUNTER — OFFICE VISIT (OUTPATIENT)
Dept: FAMILY MEDICINE | Facility: CLINIC | Age: 45
End: 2025-05-19
Payer: MEDICARE

## 2025-05-19 VITALS
OXYGEN SATURATION: 100 % | HEIGHT: 64 IN | SYSTOLIC BLOOD PRESSURE: 136 MMHG | HEART RATE: 105 BPM | RESPIRATION RATE: 18 BRPM | TEMPERATURE: 98.1 F | BODY MASS INDEX: 30.56 KG/M2 | WEIGHT: 179 LBS | DIASTOLIC BLOOD PRESSURE: 90 MMHG

## 2025-05-19 DIAGNOSIS — N18.6 ESRD (END STAGE RENAL DISEASE) ON DIALYSIS (H): ICD-10-CM

## 2025-05-19 DIAGNOSIS — I69.351 HEMIPARESIS AFFECTING RIGHT SIDE AS LATE EFFECT OF CEREBROVASCULAR ACCIDENT (H): ICD-10-CM

## 2025-05-19 DIAGNOSIS — G40.909 SEIZURE DISORDER (H): ICD-10-CM

## 2025-05-19 DIAGNOSIS — I10 ESSENTIAL HYPERTENSION: Primary | ICD-10-CM

## 2025-05-19 DIAGNOSIS — Z99.2 ESRD (END STAGE RENAL DISEASE) ON DIALYSIS (H): ICD-10-CM

## 2025-05-19 DIAGNOSIS — K21.00 GASTROESOPHAGEAL REFLUX DISEASE WITH ESOPHAGITIS WITHOUT HEMORRHAGE: ICD-10-CM

## 2025-05-19 DIAGNOSIS — F33.1 MAJOR DEPRESSIVE DISORDER, RECURRENT EPISODE, MODERATE (H): ICD-10-CM

## 2025-05-19 DIAGNOSIS — L25.9 CONTACT DERMATITIS, UNSPECIFIED CONTACT DERMATITIS TYPE, UNSPECIFIED TRIGGER: ICD-10-CM

## 2025-05-19 PROCEDURE — G2211 COMPLEX E/M VISIT ADD ON: HCPCS | Performed by: FAMILY MEDICINE

## 2025-05-19 PROCEDURE — T1013 SIGN LANG/ORAL INTERPRETER: HCPCS | Mod: GT

## 2025-05-19 PROCEDURE — 3075F SYST BP GE 130 - 139MM HG: CPT | Performed by: FAMILY MEDICINE

## 2025-05-19 PROCEDURE — 99215 OFFICE O/P EST HI 40 MIN: CPT | Performed by: FAMILY MEDICINE

## 2025-05-19 PROCEDURE — 80061 LIPID PANEL: CPT | Performed by: FAMILY MEDICINE

## 2025-05-19 PROCEDURE — 3080F DIAST BP >= 90 MM HG: CPT | Performed by: FAMILY MEDICINE

## 2025-05-19 PROCEDURE — 99417 PROLNG OP E/M EACH 15 MIN: CPT | Performed by: FAMILY MEDICINE

## 2025-05-19 PROCEDURE — 80048 BASIC METABOLIC PNL TOTAL CA: CPT | Performed by: FAMILY MEDICINE

## 2025-05-19 PROCEDURE — 36415 COLL VENOUS BLD VENIPUNCTURE: CPT | Performed by: FAMILY MEDICINE

## 2025-05-19 RX ORDER — CALCITRIOL 0.5 UG/1
0.5 CAPSULE, LIQUID FILLED ORAL 2 TIMES DAILY
Qty: 180 CAPSULE | Refills: 1 | Status: SHIPPED | OUTPATIENT
Start: 2025-05-19

## 2025-05-19 RX ORDER — SERTRALINE HYDROCHLORIDE 25 MG/1
25 TABLET, FILM COATED ORAL DAILY
Qty: 90 TABLET | Refills: 1 | Status: SHIPPED | OUTPATIENT
Start: 2025-05-19

## 2025-05-19 RX ORDER — CEPHALEXIN 500 MG/1
CAPSULE ORAL
COMMUNITY
Start: 2025-05-05

## 2025-05-19 RX ORDER — METOPROLOL TARTRATE 25 MG/1
12.5 TABLET, FILM COATED ORAL 2 TIMES DAILY
Qty: 90 TABLET | Refills: 1 | Status: SHIPPED | OUTPATIENT
Start: 2025-05-19

## 2025-05-19 RX ORDER — B COMPLEX, C NO.20/FOLIC ACID 1 MG
1 CAPSULE ORAL DAILY
Qty: 90 CAPSULE | Refills: 1 | Status: SHIPPED | OUTPATIENT
Start: 2025-05-19

## 2025-05-19 RX ORDER — TRIAMCINOLONE ACETONIDE 5 MG/G
CREAM TOPICAL 2 TIMES DAILY
Qty: 15 G | Refills: 0 | Status: SHIPPED | OUTPATIENT
Start: 2025-05-19

## 2025-05-19 RX ORDER — OMEPRAZOLE 40 MG/1
CAPSULE, DELAYED RELEASE ORAL
COMMUNITY
Start: 2024-12-02 | End: 2025-05-19

## 2025-05-19 RX ORDER — CALCITRIOL 0.5 UG/1
CAPSULE, LIQUID FILLED ORAL
COMMUNITY
Start: 2024-12-02 | End: 2025-05-19

## 2025-05-19 RX ORDER — ASPIRIN 81 MG/1
81 TABLET, CHEWABLE ORAL DAILY
Qty: 90 TABLET | Refills: 1 | Status: SHIPPED | OUTPATIENT
Start: 2025-05-19

## 2025-05-19 RX ORDER — ACETAMINOPHEN 80 MG
TABLET,CHEWABLE ORAL
Qty: 1 EACH | Refills: 0 | Status: SHIPPED | OUTPATIENT
Start: 2025-05-19

## 2025-05-19 RX ORDER — LEVETIRACETAM 1000 MG/1
1000 TABLET ORAL DAILY
Qty: 90 TABLET | Refills: 1 | Status: SHIPPED | OUTPATIENT
Start: 2025-05-19

## 2025-05-19 RX ORDER — ATORVASTATIN CALCIUM 40 MG/1
40 TABLET, FILM COATED ORAL DAILY
Qty: 90 TABLET | Refills: 1 | Status: SHIPPED | OUTPATIENT
Start: 2025-05-19

## 2025-05-19 RX ORDER — OMEPRAZOLE 40 MG/1
40 CAPSULE, DELAYED RELEASE ORAL 2 TIMES DAILY
Qty: 180 CAPSULE | Refills: 1 | Status: SHIPPED | OUTPATIENT
Start: 2025-05-19

## 2025-05-19 NOTE — PROGRESS NOTES
Assessment & Plan     Essential hypertension  BP slightly elevated above goal following AM dialysis session. Out of some of his medication regimen, so will restart metoprolol. Was previously prescribed 6.25 mg BID but has been taking 25 mg BID per his nephew's report. Will split the different and restart 12.5 mg BID with close follow up in the next 2 weeks. Instructed them to monitor for lightheadedness and to call us if this develops after restarting the metoprolol.   - Basic metabolic panel; Future  - Basic metabolic panel  - Misc. Devices (PILL SPLITTER) MISC; Use 1 device to split metoprolol pills  - metoprolol tartrate (LOPRESSOR) 25 MG tablet; Take 0.5 tablets (12.5 mg) by mouth 2 times daily.    ESRD (end stage renal disease) on dialysis (H)  Due to IgA nephropathy. Dialyzing MWF at [facility]. Refills requested today as follows.  - Basic metabolic panel; Future  - Lipid panel reflex to direct LDL Fasting; Future  - Basic metabolic panel  - Lipid panel reflex to direct LDL Fasting  - atorvastatin (LIPITOR) 40 MG tablet; Take 1 tablet (40 mg) by mouth daily.  - multivitamin RENAL (TRIPHROCAPS) 1 capsule capsule; Take 1 capsule by mouth daily.  - calcitRIOL (ROCALTROL) 0.5 MCG capsule; Take 1 capsule (0.5 mcg) by mouth 2 times daily.      Contact dermatitis, unspecified contact dermatitis type, unspecified trigger  Left arm redness and itching just adjacent to left arm AV fistula with management by nephrology team with cephalexin for the past few days. Redness and itching are a bit worse per his report today, and more itchy than anything else. Exam more consistent with contact dermatitis, possibly from use of BenGay in the area. Recent hospitalization for bacteremia and sepsis from HD catheter in left arm. Follow up with ID in early May after completing IV antibiotics. Will add on a medium dose triamcinolone to treatment regimen and refill topical antihistamine. Precautions provided to follow up closely with  "dialysis physician who has previously evaluated the arm in the next few days or sooner if worsening or systemic signs of illness.  - triamcinolone (ARISTOCORT HP) 0.5 % external cream; Apply topically 2 times daily. Apply to itchy area of left arm (NOT on fistula) twice daily for 10-14 days, then stop  - diphenhydrAMINE HCl (BENADRYL) 2 % topical solution 1 Application    Seizure disorder (H)  Post-stroke and subsequent anoxic brain injury in 2010. On Keppra. No breakthrough seizures at the current dose. May benefit from a post-dialysis dose if he develops any breakthrough seizures.   - levETIRAcetam (KEPPRA) 1000 MG tablet; Take 1 tablet (1,000 mg) by mouth daily.    Gastroesophageal reflux disease with esophagitis without hemorrhage  Requesting refill. Has been on this high dose since hospitalization in Feb. after he ingested bleach in a suicide attempt. Listed as having GERD only. No clear ulcer history. Warrants further follow up to see if tapering is indicated.   - omeprazole (PRILOSEC) 40 MG DR capsule; Take 1 capsule (40 mg) by mouth 2 times daily.    Major depressive disorder, recurrent episode, moderate (H)  Was previously on sertraline 75 mg daily but abruptly discontinued this a few weeks ago. Would like to restart. Hospitalized at [facility] in Feb/March after suicide attempt by drinking bleach. No SI today. Will gradually titrate back up starting at 25 mg daily x 1 week, then 50 mg daily.  - sertraline (ZOLOFT) 25 MG tablet; Take 1 tablet (25 mg) by mouth daily.    Hemiparesis affecting right side as late effect of cerebrovascular accident (H)  CVA in 2010. Complicated by dysarthria and right-sided hemiparesis. Requesting aspirin refill.   - aspirin (ASA) 81 MG chewable tablet; Take 1 tablet (81 mg) by mouth daily.      BMI  Estimated body mass index is 30.73 kg/m  as calculated from the following:    Height as of this encounter: 1.626 m (5' 4\").    Weight as of this encounter: 81.2 kg (179 lb).   Weight " management plan: Discussed healthy diet and exercise guidelines      I spent a total of 64 minutes on the day of the visit.   Time spent by me today doing chart review, history and exam, documentation and further activities per the noteThe longitudinal plan of care for the diagnosis(es)/condition(s) as documented were addressed during this visit. Due to the added complexity in care, I will continue to support Daisy in the subsequent management and with ongoing continuity of care.      Subjective   Daisy is a 44 year old, presenting for the following health issues:  Medication Refill        5/19/2025    11:06 AM   Additional Questions   Roomed by Paw   Accompanied by Son         5/19/2025    Information    services provided? Yes   Language Hmong   Type of interpretation provided Video    Agency [facility]  Services     HPI      Here today with his nephew. New to me. Came in for a medication review after a recent hospital follow up visit with his PCP and lack of clarity about which meds he is taking.     He feels okay today. He was started on cephalexin recently at [facility] dialysis for a presumed left arm cellulitis, maybe end of the last week. It is more itchy than anything else. He was prescribed this for one week to be taken after dialysis. No fevers. Tolerating the antibiotic. The area of redness is increasing in size and is very itchy. He reports that it has grown. He denies any fatigue or reduced energy level. He has been using several creams at home on the itchy area including BenGay and diphenhydramine without relief.     He ran out of several of his medications about two weeks ago, including his sertraline and his metoprolol. He and his nephew report that he has been taking all of his other medications as prescribed on the bottles since that time. His nephew sets up his pillbox.        Objective    BP (!) 136/90   Pulse 105   Temp 98.1  F (36.7  C) (Tympanic)    "Resp 18   Ht 1.626 m (5' 4\")   Wt 81.2 kg (179 lb)   SpO2 100%   BMI 30.73 kg/m    Body mass index is 30.73 kg/m .  Physical Exam   GENERAL: alert and no distress  RESP: normal rate and effort, clear to auscultation  CV: regular rhythm, HR in the low 100s, normal S1 S2, no S3 or S4, no murmur, click or rub, palpable fistula in left forearm  MS: no gross musculoskeletal defects noted, no edema  SKIN: left arm just distal to AV fistula with erythema and overlying scale, approximately 3 x 8 cm patch  NEURO: left facial droop and right arm contracture    Signed Electronically by: Farida Cooper MD    "

## 2025-05-19 NOTE — PATIENT INSTRUCTIONS
- Stop putting BenGay on the left arm  - Start using triamcinolone on the left arm (NOT the fistula) twice daily for 10-14 days  - Use the diphenhydramine cream three times per day as needed for itching on the left arm.  - If you develop any lightheadedness when taking the metoprolol medicaiton please call us for further instructions  - Start the sertraline back at 1 tablet at bedtime. In one week if tolerating, please increase to 2 tablets.

## 2025-05-20 ENCOUNTER — RESULTS FOLLOW-UP (OUTPATIENT)
Dept: FAMILY MEDICINE | Facility: CLINIC | Age: 45
End: 2025-05-20

## 2025-05-20 ENCOUNTER — APPOINTMENT (OUTPATIENT)
Dept: INTERPRETER SERVICES | Facility: CLINIC | Age: 45
End: 2025-05-20
Payer: MEDICARE

## 2025-05-20 LAB
ANION GAP SERPL CALCULATED.3IONS-SCNC: 19 MMOL/L (ref 7–15)
BUN SERPL-MCNC: 18.8 MG/DL (ref 6–20)
CALCIUM SERPL-MCNC: 9 MG/DL (ref 8.8–10.4)
CHLORIDE SERPL-SCNC: 91 MMOL/L (ref 98–107)
CHOLEST SERPL-MCNC: 134 MG/DL
CREAT SERPL-MCNC: 4.32 MG/DL (ref 0.67–1.17)
EGFRCR SERPLBLD CKD-EPI 2021: 16 ML/MIN/1.73M2
FASTING STATUS PATIENT QL REPORTED: ABNORMAL
FASTING STATUS PATIENT QL REPORTED: NORMAL
GLUCOSE SERPL-MCNC: 71 MG/DL (ref 70–99)
HCO3 SERPL-SCNC: 24 MMOL/L (ref 22–29)
HDLC SERPL-MCNC: 72 MG/DL
LDLC SERPL CALC-MCNC: 38 MG/DL
NONHDLC SERPL-MCNC: 62 MG/DL
POTASSIUM SERPL-SCNC: 4 MMOL/L (ref 3.4–5.3)
SODIUM SERPL-SCNC: 134 MMOL/L (ref 135–145)
TRIGL SERPL-MCNC: 120 MG/DL

## 2025-06-17 ENCOUNTER — OFFICE VISIT (OUTPATIENT)
Dept: FAMILY MEDICINE | Facility: CLINIC | Age: 45
End: 2025-06-17
Payer: MEDICARE

## 2025-06-17 VITALS
SYSTOLIC BLOOD PRESSURE: 116 MMHG | RESPIRATION RATE: 18 BRPM | BODY MASS INDEX: 31.07 KG/M2 | OXYGEN SATURATION: 95 % | HEIGHT: 64 IN | WEIGHT: 182 LBS | DIASTOLIC BLOOD PRESSURE: 71 MMHG | HEART RATE: 101 BPM | TEMPERATURE: 98.7 F

## 2025-06-17 DIAGNOSIS — I10 ESSENTIAL HYPERTENSION: ICD-10-CM

## 2025-06-17 DIAGNOSIS — L25.9 CONTACT DERMATITIS, UNSPECIFIED CONTACT DERMATITIS TYPE, UNSPECIFIED TRIGGER: Primary | ICD-10-CM

## 2025-06-17 DIAGNOSIS — N18.6 ESRD (END STAGE RENAL DISEASE) ON DIALYSIS (H): ICD-10-CM

## 2025-06-17 DIAGNOSIS — Z99.2 ESRD (END STAGE RENAL DISEASE) ON DIALYSIS (H): ICD-10-CM

## 2025-06-17 DIAGNOSIS — F33.1 MAJOR DEPRESSIVE DISORDER, RECURRENT EPISODE, MODERATE (H): ICD-10-CM

## 2025-06-17 DIAGNOSIS — G40.909 SEIZURE DISORDER (H): ICD-10-CM

## 2025-06-17 DIAGNOSIS — K21.00 GASTROESOPHAGEAL REFLUX DISEASE WITH ESOPHAGITIS WITHOUT HEMORRHAGE: ICD-10-CM

## 2025-06-17 DIAGNOSIS — I69.351 HEMIPARESIS AFFECTING RIGHT SIDE AS LATE EFFECT OF CEREBROVASCULAR ACCIDENT (H): ICD-10-CM

## 2025-06-17 PROBLEM — R19.5 DARK STOOLS: Status: RESOLVED | Noted: 2024-11-26 | Resolved: 2025-06-17

## 2025-06-17 PROBLEM — N17.9 ACUTE RENAL FAILURE SUPERIMPOSED ON CHRONIC KIDNEY DISEASE: Status: RESOLVED | Noted: 2024-02-24 | Resolved: 2025-06-17

## 2025-06-17 PROBLEM — N18.9 ACUTE RENAL FAILURE SUPERIMPOSED ON CHRONIC KIDNEY DISEASE: Status: RESOLVED | Noted: 2024-02-24 | Resolved: 2025-06-17

## 2025-06-17 PROCEDURE — 3078F DIAST BP <80 MM HG: CPT | Performed by: FAMILY MEDICINE

## 2025-06-17 PROCEDURE — 3074F SYST BP LT 130 MM HG: CPT | Performed by: FAMILY MEDICINE

## 2025-06-17 PROCEDURE — 99215 OFFICE O/P EST HI 40 MIN: CPT | Performed by: FAMILY MEDICINE

## 2025-06-17 RX ORDER — METOPROLOL TARTRATE 25 MG/1
12.5 TABLET, FILM COATED ORAL 2 TIMES DAILY
Qty: 90 TABLET | Refills: 3 | Status: SHIPPED | OUTPATIENT
Start: 2025-06-17

## 2025-06-17 RX ORDER — B COMPLEX, C NO.20/FOLIC ACID 1 MG
1 CAPSULE ORAL DAILY
Qty: 90 CAPSULE | Refills: 3 | Status: SHIPPED | OUTPATIENT
Start: 2025-06-17

## 2025-06-17 RX ORDER — TRIAMCINOLONE ACETONIDE 5 MG/G
CREAM TOPICAL 2 TIMES DAILY
Qty: 15 G | Refills: 0 | Status: SHIPPED | OUTPATIENT
Start: 2025-06-17

## 2025-06-17 RX ORDER — LEVETIRACETAM 1000 MG/1
1000 TABLET ORAL DAILY
Qty: 90 TABLET | Refills: 3 | Status: SHIPPED | OUTPATIENT
Start: 2025-06-17

## 2025-06-17 RX ORDER — SODIUM BICARBONATE 650 MG/1
650 TABLET ORAL 2 TIMES DAILY
Qty: 180 TABLET | Refills: 3 | Status: SHIPPED | OUTPATIENT
Start: 2025-06-17

## 2025-06-17 RX ORDER — CITALOPRAM HYDROBROMIDE 40 MG/1
40 TABLET ORAL DAILY
Qty: 90 TABLET | Refills: 3 | Status: SHIPPED | OUTPATIENT
Start: 2025-06-17

## 2025-06-17 RX ORDER — LORATADINE 10 MG/1
10 TABLET ORAL DAILY
Qty: 30 TABLET | Refills: 1 | Status: SHIPPED | OUTPATIENT
Start: 2025-06-17

## 2025-06-17 RX ORDER — OMEPRAZOLE 40 MG/1
40 CAPSULE, DELAYED RELEASE ORAL 2 TIMES DAILY
Qty: 180 CAPSULE | Refills: 3 | Status: SHIPPED | OUTPATIENT
Start: 2025-06-17

## 2025-06-17 RX ORDER — CALCITRIOL 0.5 UG/1
0.5 CAPSULE, LIQUID FILLED ORAL 2 TIMES DAILY
Qty: 180 CAPSULE | Refills: 3 | Status: SHIPPED | OUTPATIENT
Start: 2025-06-17

## 2025-06-17 RX ORDER — ATORVASTATIN CALCIUM 40 MG/1
40 TABLET, FILM COATED ORAL DAILY
Qty: 90 TABLET | Refills: 3 | Status: SHIPPED | OUTPATIENT
Start: 2025-06-17

## 2025-06-17 RX ORDER — DIPHENHYDRAMINE HYDROCHLORIDE, ZINC ACETATE 2; .1 G/100G; G/100G
CREAM TOPICAL DAILY PRN
Qty: 35 G | Refills: 1 | Status: SHIPPED | OUTPATIENT
Start: 2025-06-17

## 2025-06-17 RX ORDER — ASPIRIN 81 MG/1
81 TABLET, CHEWABLE ORAL DAILY
Qty: 90 TABLET | Refills: 3 | Status: SHIPPED | OUTPATIENT
Start: 2025-06-17

## 2025-06-17 NOTE — PROGRESS NOTES
Assessment & Plan     (L25.9) Contact dermatitis, unspecified contact dermatitis type, unspecified trigger  (primary encounter diagnosis)  Comment: slow improvement still dry and itchy, no sign of current infection, antibiotic finished  Plan: loratadine (CLARITIN) 10 MG tablet,         diphenhydrAMINE-zinc acetate (BENADRYL) 2-0.1 %        external cream, triamcinolone (ARISTOCORT HP)         0.5 % external cream        Trouble with medicines at old pharmacy, rerouting meds for rash and other meds to [facility] pharmacy    (K21.00) Gastroesophageal reflux disease with esophagitis without hemorrhage  Comment: refill  Plan: omeprazole (PRILOSEC) 40 MG DR capsule        Controlled symptoms    (N18.6,  Z99.2) ESRD (end stage renal disease) on dialysis (H)  Comment: refilled  Plan: sodium bicarbonate 650 MG tablet, multivitamin         RENAL (TRIPHROCAPS) 1 capsule capsule,         calcitRIOL (ROCALTROL) 0.5 MCG capsule,         Confirmed taking medications    (I10) Essential hypertension  Comment: well controlled, had been titrating up slowly the lopressor  Plan: metoprolol tartrate (LOPRESSOR) 25 MG tablet        Confirmed dose today and continued and refilled    (G40.909) Seizure disorder (H)  Comment: Seizure free now for years  Plan: levETIRAcetam (KEPPRA) 1000 MG tablet        refilled    (F33.1) Major depressive disorder, recurrent episode, moderate (H)  Comment: confirmed no longer on zoloft and patient at stable mood  Plan: citalopram (CELEXA) 40 MG tablet            (I69.351) Hemiparesis affecting right side as late effect of cerebrovascular accident (H)  Comment: taking meds  Plan: aspirin (ASA) 81 MG chewable tablet,         Occupational Therapy  Referral        atorvastatin (LIPITOR) 40 MG tablet  Refilled  Has new foot orthotic, has night splint, has new wheelchair, needing hand therapy for right hand contracture          Follow-up Fall or sooner if having issues with rash      Subjective   Daisy is a  "44 year old, presenting for the following health issues:  Recheck Medication    HPI      Left arm Rash:  Thinks slow improvement but still itchy    Hypertension Follow-up    Do you check your blood pressure regularly outside of the clinic? No   Are you following a low salt diet? Yes  Are your blood pressures ever more than 140 on the top number (systolic) OR more   than 90 on the bottom number (diastolic), for example 140/90? N/A    BP Readings from Last 2 Encounters:   06/17/25 116/71   05/19/25 (!) 136/90     Trouble with medications all available at old pharmacy: needing all meds to new [facility]  How many days per week do you miss taking your medication? 0            Objective    /71   Pulse 101   Temp 98.7  F (37.1  C) (Oral)   Resp 18   Ht 1.626 m (5' 4\")   Wt 82.6 kg (182 lb)   SpO2 95%   BMI 31.24 kg/m    Body mass index is 31.24 kg/m .  Physical Exam   GENERAL: alert and no distress  RESP: lungs clear to auscultation - no rales, rhonchi or wheezes  CV: regular rate and rhythm, normal S1 S2, no S3 or S4, no murmur, click or rub, no peripheral edema  MS: no gross musculoskeletal defects noted, no edema  Right forearm rash: dry hyperpigmented with some excoriations  -fistula thrill palpated              Signed Electronically by: Patricia Mason MD    "

## 2025-07-23 ENCOUNTER — PATIENT OUTREACH (OUTPATIENT)
Dept: CARE COORDINATION | Facility: CLINIC | Age: 45
End: 2025-07-23
Payer: MEDICARE

## 2025-08-07 ENCOUNTER — THERAPY VISIT (OUTPATIENT)
Dept: OCCUPATIONAL THERAPY | Facility: REHABILITATION | Age: 45
End: 2025-08-07
Attending: FAMILY MEDICINE
Payer: MEDICARE

## 2025-08-07 DIAGNOSIS — I69.351 HEMIPARESIS AFFECTING RIGHT SIDE AS LATE EFFECT OF CEREBROVASCULAR ACCIDENT (H): ICD-10-CM

## 2025-08-14 ENCOUNTER — THERAPY VISIT (OUTPATIENT)
Dept: OCCUPATIONAL THERAPY | Facility: REHABILITATION | Age: 45
End: 2025-08-14
Attending: FAMILY MEDICINE
Payer: MEDICARE

## 2025-08-14 DIAGNOSIS — I69.351 HEMIPARESIS AFFECTING RIGHT SIDE AS LATE EFFECT OF CEREBROVASCULAR ACCIDENT (H): Primary | ICD-10-CM
